# Patient Record
Sex: FEMALE | URBAN - METROPOLITAN AREA
[De-identification: names, ages, dates, MRNs, and addresses within clinical notes are randomized per-mention and may not be internally consistent; named-entity substitution may affect disease eponyms.]

---

## 2022-10-30 ENCOUNTER — INPATIENT (INPATIENT)
Facility: HOSPITAL | Age: 67
LOS: 4 days | Discharge: HOME HEALTH | DRG: 177 | End: 2022-11-04
Attending: INTERNAL MEDICINE | Admitting: STUDENT IN AN ORGANIZED HEALTH CARE EDUCATION/TRAINING PROGRAM
Payer: COMMERCIAL

## 2022-10-30 VITALS
RESPIRATION RATE: 19 BRPM | TEMPERATURE: 100 F | HEART RATE: 100 BPM | SYSTOLIC BLOOD PRESSURE: 202 MMHG | HEIGHT: 60 IN | OXYGEN SATURATION: 96 % | DIASTOLIC BLOOD PRESSURE: 99 MMHG | WEIGHT: 130.07 LBS

## 2022-10-30 LAB
ALBUMIN SERPL ELPH-MCNC: 4.5 G/DL — SIGNIFICANT CHANGE UP (ref 3.3–5)
ALP SERPL-CCNC: 134 U/L — HIGH (ref 40–120)
ALT FLD-CCNC: 15 U/L — SIGNIFICANT CHANGE UP (ref 10–45)
ANION GAP SERPL CALC-SCNC: 12 MMOL/L — SIGNIFICANT CHANGE UP (ref 5–17)
AST SERPL-CCNC: 17 U/L — SIGNIFICANT CHANGE UP (ref 10–40)
BASE EXCESS BLDV CALC-SCNC: 1.1 MMOL/L — SIGNIFICANT CHANGE UP (ref -2–3)
BASOPHILS # BLD AUTO: 0.03 K/UL — SIGNIFICANT CHANGE UP (ref 0–0.2)
BASOPHILS NFR BLD AUTO: 0.3 % — SIGNIFICANT CHANGE UP (ref 0–2)
BILIRUB SERPL-MCNC: 0.7 MG/DL — SIGNIFICANT CHANGE UP (ref 0.2–1.2)
BLOOD GAS VENOUS - CREATININE: SIGNIFICANT CHANGE UP MG/DL (ref 0.5–1.3)
BUN SERPL-MCNC: 11 MG/DL — SIGNIFICANT CHANGE UP (ref 7–23)
CA-I SERPL-SCNC: 1.16 MMOL/L — SIGNIFICANT CHANGE UP (ref 1.15–1.33)
CALCIUM SERPL-MCNC: 9 MG/DL — SIGNIFICANT CHANGE UP (ref 8.4–10.5)
CHLORIDE BLDV-SCNC: 101 MMOL/L — SIGNIFICANT CHANGE UP (ref 96–108)
CHLORIDE SERPL-SCNC: 101 MMOL/L — SIGNIFICANT CHANGE UP (ref 96–108)
CO2 BLDV-SCNC: 28 MMOL/L — HIGH (ref 22–26)
CO2 SERPL-SCNC: 25 MMOL/L — SIGNIFICANT CHANGE UP (ref 22–31)
CREAT SERPL-MCNC: 0.45 MG/DL — LOW (ref 0.5–1.3)
EGFR: 107 ML/MIN/1.73M2 — SIGNIFICANT CHANGE UP
EOSINOPHIL # BLD AUTO: 0.04 K/UL — SIGNIFICANT CHANGE UP (ref 0–0.5)
EOSINOPHIL NFR BLD AUTO: 0.4 % — SIGNIFICANT CHANGE UP (ref 0–6)
GAS PNL BLDV: 133 MMOL/L — LOW (ref 136–145)
GAS PNL BLDV: SIGNIFICANT CHANGE UP
GAS PNL BLDV: SIGNIFICANT CHANGE UP
GLUCOSE BLDV-MCNC: 256 MG/DL — HIGH (ref 70–99)
GLUCOSE SERPL-MCNC: 240 MG/DL — HIGH (ref 70–99)
HCO3 BLDV-SCNC: 27 MMOL/L — SIGNIFICANT CHANGE UP (ref 22–29)
HCT VFR BLD CALC: 43.2 % — SIGNIFICANT CHANGE UP (ref 34.5–45)
HCT VFR BLDA CALC: 53 % — HIGH (ref 34.5–46.5)
HGB BLD CALC-MCNC: 17.5 G/DL — HIGH (ref 11.7–16.1)
HGB BLD-MCNC: 14.5 G/DL — SIGNIFICANT CHANGE UP (ref 11.5–15.5)
IMM GRANULOCYTES NFR BLD AUTO: 0.4 % — SIGNIFICANT CHANGE UP (ref 0–0.9)
LACTATE BLDV-MCNC: 1.7 MMOL/L — SIGNIFICANT CHANGE UP (ref 0.5–2)
LYMPHOCYTES # BLD AUTO: 1.05 K/UL — SIGNIFICANT CHANGE UP (ref 1–3.3)
LYMPHOCYTES # BLD AUTO: 10.2 % — LOW (ref 13–44)
MAGNESIUM SERPL-MCNC: 2.1 MG/DL — SIGNIFICANT CHANGE UP (ref 1.6–2.6)
MCHC RBC-ENTMCNC: 29.1 PG — SIGNIFICANT CHANGE UP (ref 27–34)
MCHC RBC-ENTMCNC: 33.6 GM/DL — SIGNIFICANT CHANGE UP (ref 32–36)
MCV RBC AUTO: 86.6 FL — SIGNIFICANT CHANGE UP (ref 80–100)
MONOCYTES # BLD AUTO: 0.88 K/UL — SIGNIFICANT CHANGE UP (ref 0–0.9)
MONOCYTES NFR BLD AUTO: 8.6 % — SIGNIFICANT CHANGE UP (ref 2–14)
NEUTROPHILS # BLD AUTO: 8.22 K/UL — HIGH (ref 1.8–7.4)
NEUTROPHILS NFR BLD AUTO: 80.1 % — HIGH (ref 43–77)
NRBC # BLD: 0 /100 WBCS — SIGNIFICANT CHANGE UP (ref 0–0)
PCO2 BLDV: 44 MMHG — HIGH (ref 39–42)
PH BLDV: 7.39 — SIGNIFICANT CHANGE UP (ref 7.32–7.43)
PLATELET # BLD AUTO: 203 K/UL — SIGNIFICANT CHANGE UP (ref 150–400)
PO2 BLDV: 55 MMHG — HIGH (ref 25–45)
POTASSIUM BLDV-SCNC: 3.3 MMOL/L — LOW (ref 3.5–5.1)
POTASSIUM SERPL-MCNC: 3.4 MMOL/L — LOW (ref 3.5–5.3)
POTASSIUM SERPL-SCNC: 3.4 MMOL/L — LOW (ref 3.5–5.3)
PROT SERPL-MCNC: 7.8 G/DL — SIGNIFICANT CHANGE UP (ref 6–8.3)
RBC # BLD: 4.99 M/UL — SIGNIFICANT CHANGE UP (ref 3.8–5.2)
RBC # FLD: 12 % — SIGNIFICANT CHANGE UP (ref 10.3–14.5)
SAO2 % BLDV: 89.8 % — HIGH (ref 67–88)
SODIUM SERPL-SCNC: 138 MMOL/L — SIGNIFICANT CHANGE UP (ref 135–145)
TROPONIN T, HIGH SENSITIVITY RESULT: 6 NG/L — SIGNIFICANT CHANGE UP (ref 0–51)
WBC # BLD: 10.26 K/UL — SIGNIFICANT CHANGE UP (ref 3.8–10.5)
WBC # FLD AUTO: 10.26 K/UL — SIGNIFICANT CHANGE UP (ref 3.8–10.5)

## 2022-10-30 PROCEDURE — 93010 ELECTROCARDIOGRAM REPORT: CPT

## 2022-10-30 PROCEDURE — 71045 X-RAY EXAM CHEST 1 VIEW: CPT | Mod: 26

## 2022-10-30 PROCEDURE — 70450 CT HEAD/BRAIN W/O DYE: CPT | Mod: 26,MA

## 2022-10-30 PROCEDURE — 99284 EMERGENCY DEPT VISIT MOD MDM: CPT | Mod: CS

## 2022-10-30 RX ORDER — ACETAMINOPHEN 500 MG
1000 TABLET ORAL ONCE
Refills: 0 | Status: COMPLETED | OUTPATIENT
Start: 2022-10-30 | End: 2022-10-30

## 2022-10-30 RX ORDER — SODIUM CHLORIDE 9 MG/ML
1000 INJECTION, SOLUTION INTRAVENOUS ONCE
Refills: 0 | Status: COMPLETED | OUTPATIENT
Start: 2022-10-30 | End: 2022-10-30

## 2022-10-30 RX ADMIN — Medication 400 MILLIGRAM(S): at 23:55

## 2022-10-30 RX ADMIN — SODIUM CHLORIDE 1000 MILLILITER(S): 9 INJECTION, SOLUTION INTRAVENOUS at 23:55

## 2022-10-30 NOTE — ED PROVIDER NOTE - OBJECTIVE STATEMENT
66 yo f pmhx htn and dm not on any meds presents to ed w +covid w headache, cp, sob, chills and muscle aches, and htn. took her bp at home and it was high so she came to ed. was tested for covid today and was +. has been feeling sick the past few days, and yesterday the cp and sob got worse at night, son says she took tylenol yesterday. on exam endorsed headache with mild blurry vision b/l. no fnd on exam. son says she has baseline hpertension and high sugar and her PMD told her to take meds but she did not want to. pt does not known baseline blood pressure. on exam pt hypoxic 92% on RA, lungs clear to auscultation, tachy to 105, and bp 192 /110.

## 2022-10-30 NOTE — ED PROVIDER NOTE - ATTENDING CONTRIBUTION TO CARE
Noe Devi MD:   I personally saw the patient and performed a substantive portion of the visit including all aspects of the medical decision making.    MDM: Noe Devi MD:   I personally saw the patient and performed a substantive portion of the visit including all aspects of the medical decision making.    MDM: 67-year-old female with history of hypertension and diabetes but not on medications, who presents with elevated blood pressure.  Patient was feeling headache, chest discomfort, shortness of breath, fevers, chills, muscle aches, and wanted to take her blood pressure, found to be 190s at home, but unknown normal BPs.  Also reports mild headache on examination and blurry vision.  On examination patient was found to be hypoxic to 92%, tachycardic to 105 and elevated BP 1 92-1 10, lungs CTA B, neuro exam nonfocal.  Will obtain labs to evaluate for hematologic disorder, metabolic derangements, hepatic and renal function, and screen for infectious pathology.  Will assess with EKG, cardiac monitor and troponin to evaluate for ACS.  D-dimer to rule out PE.  Patient placed on 4 L nasal cannula for hypoxia, found to be COVID-positive, was given dexamethasone.  CT head given patient's headache and vision changes with the setting of elevated BP, no acute ICH or acute pathology, but there is chronic microvascular ischemic changes and indeterminate age infarct.  Patient also found to be hyperglycemic, but normal anion gap, not acidotic.  Due to persistent elevation in BP, was given oral antihypertensive, based on patient's prior medication/dose.  The patient will need to be admitted to the hospital for continued evaluation and management, as well as to optimize medical management and to provide outpatient needs assessment.  Discussed with the accepting physician regarding the initial presentation, diagnostic studies, treatments given in the ED, and current plan of care.   The patient was accepted by and endorsed to the medicine team.

## 2022-10-30 NOTE — ED PROVIDER NOTE - WR ORDER NAME 1
What Is The Reason For Today's Visit?: Surveillance against skin cancer recurrences
Year Excised?: 2005
Xray Chest 1 View- PORTABLE-Urgent

## 2022-10-30 NOTE — ED PROVIDER NOTE - NS ED ROS FT
General: +chills  HENT: + nasal congestion, rhinorrhea  Eyes: + blurred vision  CV: +chest pain  Resp: +difficulty breathing, cough  Abdominal: denies nausea, vomiting, diarrhea, abdominal pain  MSK: +muscle aches  Neuro: +headaches,

## 2022-10-30 NOTE — ED PROVIDER NOTE - CARE PLAN
Principal Discharge DX:	Hypertension   1 Principal Discharge DX:	Acute hypoxemic respiratory failure due to COVID-19  Secondary Diagnosis:	Hypertensive emergency

## 2022-10-30 NOTE — ED PROVIDER NOTE - PHYSICAL EXAMINATION
CARDIAC: sinus tach no appreciable murmurs  PULM: diminished breath sounds l base, no rales, rhonchi, wheezing  GI: abdomen nondistended, soft, nontender, no guarding, rebound tenderness  NEURO: no focal motor or sensory deficits, CN2-12 intact, normal speech,   MSK: no peripheral edema, no calf tenderness b/l CARDIAC: sinus tach no appreciable murmurs  PULM: diminished breath sounds l base, no rales, rhonchi, wheezing  GI: abdomen nondistended, soft, nontender, no guarding, rebound tenderness  NEURO: no focal motor or sensory deficits, CN2-12 intact, normal speech,   MSK: no peripheral edema, no calf tenderness b/l  SKIN: pulsatile fistula right upper arm

## 2022-10-30 NOTE — ED PROVIDER NOTE - CLINICAL SUMMARY MEDICAL DECISION MAKING FREE TEXT BOX
68 yo f w covid tested + today presents to ed bc of high blood pressure. son took it w her at home and it was 190s systolic. patient has been told she has HTN and DM but does not want to take any meds. endorses headache, mild change in vision, chest pain and sob, cough, sore throat, muscle aches. hypoxic 92% on RA 97% on 4L NC, tachy 110, bp on arrival 201 systolic, in room 192/110. concern for covid pneumonia, vs PE (covid hypercoag, hypoxic, tachycardic), and also htn emergency-headache and blurred vision no other fnd on exam. will give tylenol fluids basic blood work, cxray to r/o pneumonia, dimer, ekg, and ct head.

## 2022-10-31 DIAGNOSIS — I10 ESSENTIAL (PRIMARY) HYPERTENSION: ICD-10-CM

## 2022-10-31 DIAGNOSIS — E78.5 HYPERLIPIDEMIA, UNSPECIFIED: ICD-10-CM

## 2022-10-31 DIAGNOSIS — U07.1 COVID-19: ICD-10-CM

## 2022-10-31 DIAGNOSIS — E11.9 TYPE 2 DIABETES MELLITUS WITHOUT COMPLICATIONS: ICD-10-CM

## 2022-10-31 DIAGNOSIS — Z29.9 ENCOUNTER FOR PROPHYLACTIC MEASURES, UNSPECIFIED: ICD-10-CM

## 2022-10-31 LAB
A1C WITH ESTIMATED AVERAGE GLUCOSE RESULT: 11 % — HIGH (ref 4–5.6)
ALBUMIN SERPL ELPH-MCNC: 4.2 G/DL — SIGNIFICANT CHANGE UP (ref 3.3–5)
ALP SERPL-CCNC: 114 U/L — SIGNIFICANT CHANGE UP (ref 40–120)
ALT FLD-CCNC: 15 U/L — SIGNIFICANT CHANGE UP (ref 10–45)
ANION GAP SERPL CALC-SCNC: 13 MMOL/L — SIGNIFICANT CHANGE UP (ref 5–17)
AST SERPL-CCNC: 15 U/L — SIGNIFICANT CHANGE UP (ref 10–40)
BILIRUB SERPL-MCNC: 0.7 MG/DL — SIGNIFICANT CHANGE UP (ref 0.2–1.2)
BUN SERPL-MCNC: 11 MG/DL — SIGNIFICANT CHANGE UP (ref 7–23)
CALCIUM SERPL-MCNC: 9.3 MG/DL — SIGNIFICANT CHANGE UP (ref 8.4–10.5)
CHLORIDE SERPL-SCNC: 102 MMOL/L — SIGNIFICANT CHANGE UP (ref 96–108)
CO2 SERPL-SCNC: 23 MMOL/L — SIGNIFICANT CHANGE UP (ref 22–31)
CREAT SERPL-MCNC: 0.46 MG/DL — LOW (ref 0.5–1.3)
D DIMER BLD IA.RAPID-MCNC: <150 NG/ML DDU — SIGNIFICANT CHANGE UP
EGFR: 106 ML/MIN/1.73M2 — SIGNIFICANT CHANGE UP
ESTIMATED AVERAGE GLUCOSE: 269 MG/DL — HIGH (ref 68–114)
FLUAV AG NPH QL: SIGNIFICANT CHANGE UP
FLUBV AG NPH QL: SIGNIFICANT CHANGE UP
GLUCOSE BLDC GLUCOMTR-MCNC: 241 MG/DL — HIGH (ref 70–99)
GLUCOSE BLDC GLUCOMTR-MCNC: 245 MG/DL — HIGH (ref 70–99)
GLUCOSE BLDC GLUCOMTR-MCNC: 267 MG/DL — HIGH (ref 70–99)
GLUCOSE BLDC GLUCOMTR-MCNC: 413 MG/DL — HIGH (ref 70–99)
GLUCOSE SERPL-MCNC: 288 MG/DL — HIGH (ref 70–99)
HCT VFR BLD CALC: 42.2 % — SIGNIFICANT CHANGE UP (ref 34.5–45)
HGB BLD-MCNC: 14.5 G/DL — SIGNIFICANT CHANGE UP (ref 11.5–15.5)
MCHC RBC-ENTMCNC: 29.7 PG — SIGNIFICANT CHANGE UP (ref 27–34)
MCHC RBC-ENTMCNC: 34.4 GM/DL — SIGNIFICANT CHANGE UP (ref 32–36)
MCV RBC AUTO: 86.3 FL — SIGNIFICANT CHANGE UP (ref 80–100)
NRBC # BLD: 0 /100 WBCS — SIGNIFICANT CHANGE UP (ref 0–0)
PLATELET # BLD AUTO: 204 K/UL — SIGNIFICANT CHANGE UP (ref 150–400)
POTASSIUM SERPL-MCNC: 4 MMOL/L — SIGNIFICANT CHANGE UP (ref 3.5–5.3)
POTASSIUM SERPL-SCNC: 4 MMOL/L — SIGNIFICANT CHANGE UP (ref 3.5–5.3)
PROT SERPL-MCNC: 7.6 G/DL — SIGNIFICANT CHANGE UP (ref 6–8.3)
RBC # BLD: 4.89 M/UL — SIGNIFICANT CHANGE UP (ref 3.8–5.2)
RBC # FLD: 12.1 % — SIGNIFICANT CHANGE UP (ref 10.3–14.5)
RSV RNA NPH QL NAA+NON-PROBE: SIGNIFICANT CHANGE UP
SARS-COV-2 RNA SPEC QL NAA+PROBE: DETECTED
SODIUM SERPL-SCNC: 138 MMOL/L — SIGNIFICANT CHANGE UP (ref 135–145)
WBC # BLD: 8.34 K/UL — SIGNIFICANT CHANGE UP (ref 3.8–10.5)
WBC # FLD AUTO: 8.34 K/UL — SIGNIFICANT CHANGE UP (ref 3.8–10.5)

## 2022-10-31 PROCEDURE — 12345: CPT | Mod: NC

## 2022-10-31 PROCEDURE — 99223 1ST HOSP IP/OBS HIGH 75: CPT | Mod: GC

## 2022-10-31 RX ORDER — INSULIN LISPRO 100/ML
VIAL (ML) SUBCUTANEOUS AT BEDTIME
Refills: 0 | Status: DISCONTINUED | OUTPATIENT
Start: 2022-10-31 | End: 2022-11-01

## 2022-10-31 RX ORDER — POTASSIUM CHLORIDE 20 MEQ
40 PACKET (EA) ORAL ONCE
Refills: 0 | Status: DISCONTINUED | OUTPATIENT
Start: 2022-10-31 | End: 2022-10-31

## 2022-10-31 RX ORDER — DEXTROSE 50 % IN WATER 50 %
25 SYRINGE (ML) INTRAVENOUS ONCE
Refills: 0 | Status: DISCONTINUED | OUTPATIENT
Start: 2022-10-31 | End: 2022-11-04

## 2022-10-31 RX ORDER — AMLODIPINE BESYLATE 2.5 MG/1
2.5 TABLET ORAL ONCE
Refills: 0 | Status: COMPLETED | OUTPATIENT
Start: 2022-10-31 | End: 2022-10-31

## 2022-10-31 RX ORDER — AMLODIPINE BESYLATE 2.5 MG/1
5 TABLET ORAL DAILY
Refills: 0 | Status: DISCONTINUED | OUTPATIENT
Start: 2022-10-31 | End: 2022-11-01

## 2022-10-31 RX ORDER — BNT162B2 ORIGINAL AND OMICRON BA.4/BA.5 .1125; .1125 MG/2.25ML; MG/2.25ML
0.3 INJECTION, SUSPENSION INTRAMUSCULAR ONCE
Refills: 0 | Status: DISCONTINUED | OUTPATIENT
Start: 2022-10-31 | End: 2022-10-31

## 2022-10-31 RX ORDER — POTASSIUM CHLORIDE 20 MEQ
20 PACKET (EA) ORAL ONCE
Refills: 0 | Status: COMPLETED | OUTPATIENT
Start: 2022-10-31 | End: 2022-10-31

## 2022-10-31 RX ORDER — DEXAMETHASONE 0.5 MG/5ML
6 ELIXIR ORAL DAILY
Refills: 0 | Status: DISCONTINUED | OUTPATIENT
Start: 2022-10-31 | End: 2022-11-04

## 2022-10-31 RX ORDER — INSULIN LISPRO 100/ML
VIAL (ML) SUBCUTANEOUS
Refills: 0 | Status: DISCONTINUED | OUTPATIENT
Start: 2022-10-31 | End: 2022-11-01

## 2022-10-31 RX ORDER — GLUCAGON INJECTION, SOLUTION 0.5 MG/.1ML
1 INJECTION, SOLUTION SUBCUTANEOUS ONCE
Refills: 0 | Status: DISCONTINUED | OUTPATIENT
Start: 2022-10-31 | End: 2022-11-04

## 2022-10-31 RX ORDER — HYDRALAZINE HCL 50 MG
10 TABLET ORAL DAILY
Refills: 0 | Status: DISCONTINUED | OUTPATIENT
Start: 2022-10-31 | End: 2022-11-04

## 2022-10-31 RX ORDER — DEXTROSE 50 % IN WATER 50 %
12.5 SYRINGE (ML) INTRAVENOUS ONCE
Refills: 0 | Status: DISCONTINUED | OUTPATIENT
Start: 2022-10-31 | End: 2022-11-04

## 2022-10-31 RX ORDER — INSULIN GLARGINE 100 [IU]/ML
5 INJECTION, SOLUTION SUBCUTANEOUS ONCE
Refills: 0 | Status: DISCONTINUED | OUTPATIENT
Start: 2022-10-31 | End: 2022-10-31

## 2022-10-31 RX ORDER — DEXTROSE 50 % IN WATER 50 %
15 SYRINGE (ML) INTRAVENOUS ONCE
Refills: 0 | Status: DISCONTINUED | OUTPATIENT
Start: 2022-10-31 | End: 2022-10-31

## 2022-10-31 RX ORDER — DEXAMETHASONE 0.5 MG/5ML
6 ELIXIR ORAL ONCE
Refills: 0 | Status: COMPLETED | OUTPATIENT
Start: 2022-10-31 | End: 2022-10-31

## 2022-10-31 RX ORDER — ENOXAPARIN SODIUM 100 MG/ML
40 INJECTION SUBCUTANEOUS EVERY 24 HOURS
Refills: 0 | Status: DISCONTINUED | OUTPATIENT
Start: 2022-10-31 | End: 2022-11-04

## 2022-10-31 RX ORDER — SODIUM CHLORIDE 9 MG/ML
1000 INJECTION, SOLUTION INTRAVENOUS
Refills: 0 | Status: DISCONTINUED | OUTPATIENT
Start: 2022-10-31 | End: 2022-11-04

## 2022-10-31 RX ORDER — INFLUENZA VIRUS VACCINE 15; 15; 15; 15 UG/.5ML; UG/.5ML; UG/.5ML; UG/.5ML
0.7 SUSPENSION INTRAMUSCULAR ONCE
Refills: 0 | Status: DISCONTINUED | OUTPATIENT
Start: 2022-10-31 | End: 2022-10-31

## 2022-10-31 RX ORDER — REMDESIVIR 5 MG/ML
100 INJECTION INTRAVENOUS EVERY 24 HOURS
Refills: 0 | Status: COMPLETED | OUTPATIENT
Start: 2022-11-01 | End: 2022-11-04

## 2022-10-31 RX ORDER — INSULIN GLARGINE 100 [IU]/ML
5 INJECTION, SOLUTION SUBCUTANEOUS EVERY MORNING
Refills: 0 | Status: DISCONTINUED | OUTPATIENT
Start: 2022-10-31 | End: 2022-10-31

## 2022-10-31 RX ORDER — REMDESIVIR 5 MG/ML
200 INJECTION INTRAVENOUS EVERY 24 HOURS
Refills: 0 | Status: COMPLETED | OUTPATIENT
Start: 2022-10-31 | End: 2022-10-31

## 2022-10-31 RX ORDER — DEXAMETHASONE 0.5 MG/5ML
5 ELIXIR ORAL ONCE
Refills: 0 | Status: DISCONTINUED | OUTPATIENT
Start: 2022-10-31 | End: 2022-10-31

## 2022-10-31 RX ORDER — INSULIN GLARGINE 100 [IU]/ML
5 INJECTION, SOLUTION SUBCUTANEOUS AT BEDTIME
Refills: 0 | Status: DISCONTINUED | OUTPATIENT
Start: 2022-10-31 | End: 2022-11-01

## 2022-10-31 RX ORDER — HYDRALAZINE HCL 50 MG
10 TABLET ORAL ONCE
Refills: 0 | Status: DISCONTINUED | OUTPATIENT
Start: 2022-10-31 | End: 2022-11-04

## 2022-10-31 RX ORDER — REMDESIVIR 5 MG/ML
INJECTION INTRAVENOUS
Refills: 0 | Status: COMPLETED | OUTPATIENT
Start: 2022-10-31 | End: 2022-11-04

## 2022-10-31 RX ADMIN — Medication 6 MILLIGRAM(S): at 01:35

## 2022-10-31 RX ADMIN — Medication 4: at 21:40

## 2022-10-31 RX ADMIN — AMLODIPINE BESYLATE 5 MILLIGRAM(S): 2.5 TABLET ORAL at 12:53

## 2022-10-31 RX ADMIN — Medication 2: at 18:41

## 2022-10-31 RX ADMIN — INSULIN GLARGINE 5 UNIT(S): 100 INJECTION, SOLUTION SUBCUTANEOUS at 21:40

## 2022-10-31 RX ADMIN — REMDESIVIR 500 MILLIGRAM(S): 5 INJECTION INTRAVENOUS at 17:51

## 2022-10-31 RX ADMIN — AMLODIPINE BESYLATE 2.5 MILLIGRAM(S): 2.5 TABLET ORAL at 01:31

## 2022-10-31 RX ADMIN — Medication 20 MILLIEQUIVALENT(S): at 06:58

## 2022-10-31 RX ADMIN — Medication 2: at 13:26

## 2022-10-31 NOTE — H&P ADULT - PROBLEM SELECTOR PLAN 2
on 4 L nasal cannula for hypoxia  cxr neg  -s/p dexamethasone  - continue to monitor sats  - isolation on 4 L nasal cannula for hypoxia  cxr neg  neg DD  -s/p dexamethasone  - continue to monitor sats  - isolation  - lovenox ppx

## 2022-10-31 NOTE — H&P ADULT - ATTENDING COMMENTS
Pt was seen and examined during key portion of E/M service. Case discussed with resident. H&P reviewed and edited where appropriate. Other than the following, I agree with the above history, exam, assessment, and plan.  67F with PMH DM2, HTN and HLD presents with hypertension and COVID concerning for pneumonia.  start norvasc. lovenox, decadron. monitor for hypoxia. hold off abx. start lantus + ISS

## 2022-10-31 NOTE — ED ADULT NURSE NOTE - CARDIO ASSESSMENT
--- Debridement Text: The wound edges were debrided prior to proceeding with the closure to facilitate wound healing.

## 2022-10-31 NOTE — H&P ADULT - NSHPPHYSICALEXAM_GEN_ALL_CORE
CONSTITUTIONAL: NAD  HEAD: normocephalic, atraumatic  EYES: PERRLA, conjunctiva and sclera clear  ENMT: Moist oral mucosa  RESPIRATORY: Normal respiratory effort, + decreased breath sounds b/l  CARDIOVASCULAR: Regular rate and rhythm, normal S1 and S2, no lower extremity edema  ABDOMEN: Nontender to palpation, positive bowel sounds, no rebound/guarding  MUSCULOSKELETAL: No joint swelling or tenderness to palpation, no cyanosis or edema  NEUROLOGY: Alert, oriented, CN 2-12 intact and symmetric  SKIN: No rashes, warm, dry CONSTITUTIONAL: NAD  HEAD: normocephalic, atraumatic  EYES: PERRLA, conjunctiva and sclera clear  ENMT: Moist oral mucosa  RESPIRATORY: Normal respiratory effort, + decreased breath sounds b/l  CARDIOVASCULAR: Regular rate and rhythm, normal S1 and S2, no lower extremity edema  ABDOMEN: Nontender to palpation, positive bowel sounds, no rebound/guarding  MUSCULOSKELETAL: + right thigh tenderness to palpation, no cyanosis or edema  NEUROLOGY: Alert, oriented, CN 2-12 intact and symmetric  SKIN: No rashes, warm, dry

## 2022-10-31 NOTE — H&P ADULT - NSHPREVIEWOFSYSTEMS_GEN_ALL_CORE
General: +chills, no fever  HENT: + nasal congestion, rhinorrhea  Eyes: + blurred vision  CV: + chest pain  Resp: + difficulty breathing, cough  Abdominal: denies nausea, vomiting, diarrhea, abdominal pain  MSK: +muscle aches  Neuro: + headaches, + blurry vision  Skin: no rashes General: +chills, no fever  HENT: + nasal congestion, rhinorrhea, no neck pain  Eyes: + blurred vision, no eye pain  CV: + chest pain, no palp  Resp: + difficulty breathing, cough, no wheeze  Abdominal: denies nausea, vomiting, diarrhea, abdominal pain  MSK: +muscle aches, no back pain  Neuro: + headaches, + blurry vision  Skin: no rashes, no lesions

## 2022-10-31 NOTE — ED ADULT NURSE NOTE - OBJECTIVE STATEMENT
Pt is a 67y female that came into [facility] ED with son due to elevated BP and tested positive for covid with symptoms of body aches, HA, sore throat, cough, stuffy nose and dizziness. Pt son translates at bedside, endorses an elevated home BP of 185/105 at 1100 which dropped at 1300 to170/99 and cindy again at 1600 which is when they came to the ED. PMH of unmanaged HTN, high cholesterol, and increased blood glucose taking no home medications and no significant PSH. Pt is A&Ox3 with generalized body pain of 7/10. Pt currently denies any SOB, dizziness, CP, palpitations. Placed on continuous cardiac monitor NSR and pulse oximetry, placed on 4L nasal cannula with DO Gagnon at bedside due to decreased SpO2. Denies any fever/chills, n/v/d/c, abd pain, and abd is soft and tender to touch, endorses decreased appetite. Skin is warm to the touch with strong peripheral pulses. Bed rails raised, bed in lowest position, and call bell within reach.

## 2022-10-31 NOTE — PROGRESS NOTE ADULT - PROBLEM SELECTOR PLAN 1
CT head no acute ICH or acute pathology, but chronic microvascular ischemic changes and indeterminate age infarct.   c/o HA and mild blurry vision with no findings on exam  - S/P oral amlodipine 2.5mg  - amlodipine 5mg qd--> monitor BP   - hydralazine 10mg q8 prn SBP>160 CT head no acute ICH or acute pathology, but chronic microvascular ischemic changes and indeterminate age infarct.   c/o HA and mild blurry vision with no findings on exam  - S/P oral amlodipine 2.5mg  - amlodipine 5mg qd--> monitor BP   - hydralazine 10mg q8 prn SBP>170

## 2022-10-31 NOTE — PROGRESS NOTE ADULT - PROBLEM SELECTOR PLAN 2
on 4 L nasal cannula for hypoxia  cxr neg  neg DD  -CW  dexamethasone  - continue to monitor sats  - isolation  - monitor inflammatrory markers   - Start Dexamethasone   - lovenox ppx on 4 L nasal cannula for hypoxia titrate off O2 as tolerated   cxr neg  neg DD  -CW  dexamethasone  - continue to monitor sats  - isolation  - monitor inflammatrory markers   - Start Dexamethasone   - lovenox ppx

## 2022-10-31 NOTE — H&P ADULT - NSHPLABSRESULTS_GEN_ALL_CORE
14.5   10.26 )-----------( 203      ( 30 Oct 2022 23:18 )             43.2       10-30    138  |  101  |  11  ----------------------------<  240<H>  3.4<L>   |  25  |  0.45<L>    Ca    9.0      30 Oct 2022 23:18  Mg     2.1     10-30    TPro  7.8  /  Alb  4.5  /  TBili  0.7  /  DBili  x   /  AST  17  /  ALT  15  /  AlkPhos  134<H>  10-30                      Lactate Trend            CAPILLARY BLOOD GLUCOSE 14.5   10.26 )-----------( 203      ( 30 Oct 2022 23:18 )             43.2       10-30    138  |  101  |  11  ----------------------------<  240<H>  3.4<L>   |  25  |  0.45<L>    Ca    9.0      30 Oct 2022 23:18  Mg     2.1     10-30    TPro  7.8  /  Alb  4.5  /  TBili  0.7  /  DBili  x   /  AST  17  /  ALT  15  /  AlkPhos  134<H>  10-30              Lactate Trend        CAPILLARY BLOOD GLUCOSE

## 2022-10-31 NOTE — H&P ADULT - HISTORY OF PRESENT ILLNESS
68 yo f pmhx htn and dm not on any meds presents to ed w +covid w headache, cp, sob, chills and muscle aches, and htn. took her bp at home and it was high so she came to ed. was tested for covid today and was +. has been feeling sick the past few days, and yesterday the cp and sob got worse at night, son says she took tylenol yesterday. on exam endorsed headache with mild blurry vision b/l. no fnd on exam. son says she has baseline hpertension and high sugar and her PMD told her to take meds but she did not want to. pt does not known baseline blood pressure. on exam pt hypoxic 92% on RA, lungs clear to auscultation, tachy to 105, and bp 192 /110.  '  In the ED, VS   Labs remarkable for   Imaging included   Patient given   67F with pmhx DM, HTN and HLD presents with hypertension and tested positive for COVID w headache, cp, sob, chills and muscle aches. Pt states she took her bp at home several times and it was high (up to 185/105 at home). She reports that she also tested herself at home for covid today and was has been feeling sick for the past few days. Ot note, patient adds that yesterday the cp and sob got worse at night. Per son pt took tylenol yesterday and has been c/o for the past few days of mild blurry vision b/l. Per son, patient has not seen a PMD in over three years, but was told in the past that she has HTN and DM and was told her to take meds but she did not want to.  Patient denies fevers, numbness, tingling, N/V/D. Patient states that she was vaccinated and boosted against COVID, and does not have a cardiologist. Patient lives at home with  and son. Son adds that patient is normally independent but is not very active at home.    In the ED, /99, otherwise unremarkable. Labs remarkable for K 3.4, glucose 240, alk phos 134, COVID +. Imaging included CT head non con revealed indeterminate age lacunar infarct involving left ganglio-capsular region. Chest xray neg. Patient given 1 LR, amlodipine 2.5mg, Tylenol and decadron. Patient later hypoxic to 92% on RA and placed on 4LNC.    PCP: Marin Ting

## 2022-10-31 NOTE — H&P ADULT - PROBLEM SELECTOR PLAN 1
persistent elevation in BP  CT head no acute ICH or acute pathology, but chronic microvascular ischemic changes and indeterminate age infarct.   c/o HA and mild blurry vision with no findings on exam  - S/P oral amlodipine 2.5mg persistent elevation in BP  CT head no acute ICH or acute pathology, but chronic microvascular ischemic changes and indeterminate age infarct.   c/o HA and mild blurry vision with no findings on exam  - S/P oral amlodipine 2.5mg  - amlodipine 5mg qd  - hydralazine 10mg q8 prn SBP>160

## 2022-10-31 NOTE — PATIENT PROFILE ADULT - HOME ACCESSIBILITY CONCERNS
Detail Level: Simple Detail Level: Zone Quality 137: Melanoma: Continuity Of Care - Recall System: Patient information entered into a recall system that includes: target date for the next exam specified AND a process to follow up with patients regarding missed or unscheduled appointments Quality 224: Stage 0-Iic Melanoma: Overutilization Of Imaging Studies For Only Stage 0-Iic Melanoma: None of the following diagnostic imaging studies ordered: chest X-ray, CT, Ultrasound, MRI, PET, or nuclear medicine scans (ML) none

## 2022-10-31 NOTE — PROGRESS NOTE ADULT - NSPROGADDITIONALINFOA_GEN_ALL_CORE
Mary Sharma   Hospitalist    /TEAMS Patient WANTS to go home because she is worried about her finances as her deductible is so high as per son.  Will speak with  in [place] Sharma   Hospitalist   [phone] /TEAMS

## 2022-10-31 NOTE — H&P ADULT - ASSESSMENT
67 f w covid tested + today presents to ed bc of high blood pressure. son took it w her at home and it was 190s systolic. patient has been told she has HTN and DM but does not want to take any meds. endorses headache, mild change in vision, chest pain and sob, cough, sore throat, muscle aches. hypoxic 92% on RA 97% on 4L NC, tachy 110, bp on arrival 201 systolic, in room 192/110. concern for covid pneumonia, vs PE (covid hypercoag, hypoxic, tachycardic), and also htn emergency-headache and blurred vision no other fnd on exam. will give tylenol fluids basic blood work, cxray to r/o pneumonia, dimer, ekg, and ct head. 67F with PMH DM, HTN and HLD presents with hypertension and COVID concerning for pneumonia vs PE (covid hypercoag, hypoxic, tachycardic), and also htn emergency-headache and blurred vision. 67F with PMH DM2, HTN and HLD presents with hypertension and COVID concerning for pneumonia.

## 2022-10-31 NOTE — ED ADULT NURSE REASSESSMENT NOTE - NS ED NURSE REASSESS COMMENT FT1
Pt was removed off of nasal cannula, tolerating well on room air with SpO2 of 97% and no complaints of SOB, dyspnea, or difficulty breathing. Pt endorses pain and HA relief post pain medication. Updated pt and son on plan of care and verbalized understanding. Vitals retaken and medication administered for elevated BP as per MD order. Bed at lowest position, side rails up, and call bell within reach.

## 2022-10-31 NOTE — PROGRESS NOTE ADULT - SUBJECTIVE AND OBJECTIVE BOX
Patient is a 67y old  Female who presents with a chief complaint of hypertension (31 Oct 2022 05:41)      SUBJECTIVE / OVERNIGHT EVENTS:    Tele reviewed:       ADDITIONAL REVIEW OF SYSTEMS: Negative except for above    MEDICATIONS  (STANDING):  amLODIPine   Tablet 5 milliGRAM(s) Oral daily  dexAMETHasone     Tablet 6 milliGRAM(s) Oral daily  dextrose 5%. 1000 milliLiter(s) (50 mL/Hr) IV Continuous <Continuous>  dextrose 5%. 1000 milliLiter(s) (100 mL/Hr) IV Continuous <Continuous>  dextrose 50% Injectable 25 Gram(s) IV Push once  dextrose 50% Injectable 12.5 Gram(s) IV Push once  dextrose 50% Injectable 25 Gram(s) IV Push once  enoxaparin Injectable 40 milliGRAM(s) SubCutaneous every 24 hours  glucagon  Injectable 1 milliGRAM(s) IntraMuscular once  insulin glargine Injectable (LANTUS) 5 Unit(s) SubCutaneous at bedtime  insulin lispro (ADMELOG) corrective regimen sliding scale   SubCutaneous three times a day before meals  insulin lispro (ADMELOG) corrective regimen sliding scale   SubCutaneous at bedtime    MEDICATIONS  (PRN):  hydrALAZINE 10 milliGRAM(s) Oral once PRN Systolic blood pressure >160      CAPILLARY BLOOD GLUCOSE      POCT Blood Glucose.: 267 mg/dL (31 Oct 2022 09:51)    I&O's Summary    30 Oct 2022 07:01  -  31 Oct 2022 07:00  --------------------------------------------------------  IN: 200 mL / OUT: 0 mL / NET: 200 mL        PHYSICAL EXAM:  Vital Signs Last 24 Hrs  T(C): 36.8 (31 Oct 2022 08:40), Max: 37.7 (30 Oct 2022 18:43)  T(F): 98.2 (31 Oct 2022 08:40), Max: 99.8 (30 Oct 2022 18:43)  HR: 86 (31 Oct 2022 08:40) (82 - 100)  BP: 152/81 (31 Oct 2022 08:40) (152/81 - 202/99)  BP(mean): 112 (31 Oct 2022 01:45) (112 - 112)  RR: 16 (31 Oct 2022 08:40) (16 - 19)  SpO2: 93% (31 Oct 2022 08:40) (92% - 97%)    Parameters below as of 31 Oct 2022 08:40  Patient On (Oxygen Delivery Method): room air        PHYSICAL EXAM:  GENERAL: NAD, well-developed  HEAD:  Atraumatic, Normocephalic  EYES:  conjunctiva and sclera clear  NECK: Supple, No JVD  CHEST/LUNG: Clear to auscultation bilaterally; No wheeze  HEART: Regular rate and rhythm; No murmurs, rubs, or gallops  ABDOMEN: Soft, Nontender, Nondistended; Bowel sounds present  EXTREMITIES:  2+ Peripheral Pulses, No clubbing, cyanosis, or edema  PSYCH: AAOx3  NEUROLOGY: non-focal  SKIN: No rashes or lesions      LABS:                        14.5   10.26 )-----------( 203      ( 30 Oct 2022 23:18 )             43.2     10-30    138  |  101  |  11  ----------------------------<  240<H>  3.4<L>   |  25  |  0.45<L>    Ca    9.0      30 Oct 2022 23:18  Mg     2.1     10-30    TPro  7.8  /  Alb  4.5  /  TBili  0.7  /  DBili  x   /  AST  17  /  ALT  15  /  AlkPhos  134<H>  10-30                RADIOLOGY & ADDITIONAL TESTS:    Imaging Personally Reviewed:    Electrocardiogram Personally Reviewed:    COORDINATION OF CARE:  Care Discussed with Consultants/Other Providers [Y/N]:  Prior or Outpatient Records Reviewed [Y/N]:     Patient is a 67y old  Female who presents with a chief complaint of hypertension (31 Oct 2022 05:41)      SUBJECTIVE / OVERNIGHT EVENTS:    67F with pmhx DM, HTN and HLD presents with hypertension and tested positive for COVID w headache, cp, sob, chills and muscle aches. Pt states she took her bp at home several times and it was high (up to 185/105 at home). She reports that she also tested herself at home for covid today and was has been feeling sick for the past few days. Ot note, patient adds that yesterday the cp and sob got worse at night. Per son pt took tylenol yesterday and has been c/o for the past few days of mild blurry vision b/l. Per son, patient has not seen a PMD in over three years, but was told in the past that she has HTN and DM and was told her to take meds but she did not want to.  Patient denies fevers, numbness, tingling, N/V/D. Patient states that she was vaccinated and boosted against COVID, and does not have a cardiologist. Patient lives at home with  and son. Son adds that patient is normally independent but is not very active at home.  In the ED, /99, otherwise unremarkable. Labs remarkable for K 3.4, glucose 240, alk phos 134, COVID +. Imaging included CT head non con revealed indeterminate age lacunar infarct involving left ganglio-capsular region. Chest xray neg. Patient given 1 LR, amlodipine 2.5mg, Tylenol and decadron. Patient later hypoxic to 92% on RA and placed on 4LNC.      ADDITIONAL REVIEW OF SYSTEMS: Negative except for above    MEDICATIONS  (STANDING):  amLODIPine   Tablet 5 milliGRAM(s) Oral daily  dexAMETHasone     Tablet 6 milliGRAM(s) Oral daily  dextrose 5%. 1000 milliLiter(s) (50 mL/Hr) IV Continuous <Continuous>  dextrose 5%. 1000 milliLiter(s) (100 mL/Hr) IV Continuous <Continuous>  dextrose 50% Injectable 25 Gram(s) IV Push once  dextrose 50% Injectable 12.5 Gram(s) IV Push once  dextrose 50% Injectable 25 Gram(s) IV Push once  enoxaparin Injectable 40 milliGRAM(s) SubCutaneous every 24 hours  glucagon  Injectable 1 milliGRAM(s) IntraMuscular once  insulin glargine Injectable (LANTUS) 5 Unit(s) SubCutaneous at bedtime  insulin lispro (ADMELOG) corrective regimen sliding scale   SubCutaneous three times a day before meals  insulin lispro (ADMELOG) corrective regimen sliding scale   SubCutaneous at bedtime    MEDICATIONS  (PRN):  hydrALAZINE 10 milliGRAM(s) Oral once PRN Systolic blood pressure >160      CAPILLARY BLOOD GLUCOSE      POCT Blood Glucose.: 267 mg/dL (31 Oct 2022 09:51)    I&O's Summary    30 Oct 2022 07:01  -  31 Oct 2022 07:00  --------------------------------------------------------  IN: 200 mL / OUT: 0 mL / NET: 200 mL        PHYSICAL EXAM:  Vital Signs Last 24 Hrs  T(C): 36.8 (31 Oct 2022 08:40), Max: 37.7 (30 Oct 2022 18:43)  T(F): 98.2 (31 Oct 2022 08:40), Max: 99.8 (30 Oct 2022 18:43)  HR: 86 (31 Oct 2022 08:40) (82 - 100)  BP: 152/81 (31 Oct 2022 08:40) (152/81 - 202/99)  BP(mean): 112 (31 Oct 2022 01:45) (112 - 112)  RR: 16 (31 Oct 2022 08:40) (16 - 19)  SpO2: 93% (31 Oct 2022 08:40) (92% - 97%)    Parameters below as of 31 Oct 2022 08:40  Patient On (Oxygen Delivery Method): room air        PHYSICAL EXAM:  GENERAL: NAD, well-developed  HEAD:  Atraumatic, Normocephalic  EYES:  conjunctiva and sclera clear  NECK: Supple, No JVD  CHEST/LUNG: Clear to auscultation bilaterally; No wheeze  HEART: Regular rate and rhythm; No murmurs, rubs, or gallops  ABDOMEN: Soft, Nontender, Nondistended; Bowel sounds present  EXTREMITIES:  2+ Peripheral Pulses, No clubbing, cyanosis, or edema  PSYCH: AAOx3  NEUROLOGY: non-focal  SKIN: No rashes or lesions      LABS:                        14.5   10.26 )-----------( 203      ( 30 Oct 2022 23:18 )             43.2     10-30    138  |  101  |  11  ----------------------------<  240<H>  3.4<L>   |  25  |  0.45<L>    Ca    9.0      30 Oct 2022 23:18  Mg     2.1     10-30    TPro  7.8  /  Alb  4.5  /  TBili  0.7  /  DBili  x   /  AST  17  /  ALT  15  /  AlkPhos  134<H>  10-30                RADIOLOGY & ADDITIONAL TESTS:    Imaging Personally Reviewed:    Electrocardiogram Personally Reviewed:    COORDINATION OF CARE:  Care Discussed with Consultants/Other Providers [Y/N]:  Prior or Outpatient Records Reviewed [Y/N]:     Patient is a 67y old  Female who presents with a chief complaint of hypertension (31 Oct 2022 05:41)      SUBJECTIVE / OVERNIGHT EVENTS:  Patient requests that her son translates for her in English     67F with pmhx DM, HTN and HLD presents with hypertension and tested positive for COVID w headache, cp, sob, chills and muscle aches.  In the ED, /99, otherwise unremarkable. Labs remarkable for K 3.4, glucose 240, alk phos 134, COVID +. Imaging included CT head non con revealed indeterminate age lacunar infarct involving left ganglio-capsular region. Chest xray neg. Patient given 1 LR, amlodipine 2.5mg, Tylenol and decadron. Patient later hypoxic to 92% on RA and placed on 4LNC.      ADDITIONAL REVIEW OF SYSTEMS: Negative except for above    MEDICATIONS  (STANDING):  amLODIPine   Tablet 5 milliGRAM(s) Oral daily  dexAMETHasone     Tablet 6 milliGRAM(s) Oral daily  dextrose 5%. 1000 milliLiter(s) (50 mL/Hr) IV Continuous <Continuous>  dextrose 5%. 1000 milliLiter(s) (100 mL/Hr) IV Continuous <Continuous>  dextrose 50% Injectable 25 Gram(s) IV Push once  dextrose 50% Injectable 12.5 Gram(s) IV Push once  dextrose 50% Injectable 25 Gram(s) IV Push once  enoxaparin Injectable 40 milliGRAM(s) SubCutaneous every 24 hours  glucagon  Injectable 1 milliGRAM(s) IntraMuscular once  insulin glargine Injectable (LANTUS) 5 Unit(s) SubCutaneous at bedtime  insulin lispro (ADMELOG) corrective regimen sliding scale   SubCutaneous three times a day before meals  insulin lispro (ADMELOG) corrective regimen sliding scale   SubCutaneous at bedtime    MEDICATIONS  (PRN):  hydrALAZINE 10 milliGRAM(s) Oral once PRN Systolic blood pressure >160      CAPILLARY BLOOD GLUCOSE      POCT Blood Glucose.: 267 mg/dL (31 Oct 2022 09:51)    I&O's Summary    30 Oct 2022 07:01  -  31 Oct 2022 07:00  --------------------------------------------------------  IN: 200 mL / OUT: 0 mL / NET: 200 mL        PHYSICAL EXAM:  Vital Signs Last 24 Hrs  T(C): 36.8 (31 Oct 2022 08:40), Max: 37.7 (30 Oct 2022 18:43)  T(F): 98.2 (31 Oct 2022 08:40), Max: 99.8 (30 Oct 2022 18:43)  HR: 86 (31 Oct 2022 08:40) (82 - 100)  BP: 152/81 (31 Oct 2022 08:40) (152/81 - 202/99)  BP(mean): 112 (31 Oct 2022 01:45) (112 - 112)  RR: 16 (31 Oct 2022 08:40) (16 - 19)  SpO2: 93% (31 Oct 2022 08:40) (92% - 97%)    Parameters below as of 31 Oct 2022 08:40  Patient On (Oxygen Delivery Method): room air        PHYSICAL EXAM:  GENERAL: NAD, well-developed  HEAD:  Atraumatic, Normocephalic  EYES:  conjunctiva and sclera clear  NECK: Supple, No JVD  CHEST/LUNG: Clear to auscultation bilaterally; No wheeze  HEART: Regular rate and rhythm; No murmurs, rubs, or gallops  ABDOMEN: Soft, Nontender, Nondistended; Bowel sounds present  EXTREMITIES:  2+ Peripheral Pulses, No clubbing, cyanosis, or edema  PSYCH: AAOx3  NEUROLOGY: non-focal        LABS:                        14.5   10.26 )-----------( 203      ( 30 Oct 2022 23:18 )             43.2     10-30    138  |  101  |  11  ----------------------------<  240<H>  3.4<L>   |  25  |  0.45<L>    Ca    9.0      30 Oct 2022 23:18  Mg     2.1     10-30    TPro  7.8  /  Alb  4.5  /  TBili  0.7  /  DBili  x   /  AST  17  /  ALT  15  /  AlkPhos  134<H>  10-30                RADIOLOGY & ADDITIONAL TESTS:    Imaging Personally Reviewed:    Electrocardiogram Personally Reviewed:    COORDINATION OF CARE:  Care Discussed with Consultants/Other Providers [Y/N]:  Prior or Outpatient Records Reviewed [Y/N]:

## 2022-11-01 LAB
ALBUMIN SERPL ELPH-MCNC: 4 G/DL — SIGNIFICANT CHANGE UP (ref 3.3–5)
ALP SERPL-CCNC: 118 U/L — SIGNIFICANT CHANGE UP (ref 40–120)
ALT FLD-CCNC: 16 U/L — SIGNIFICANT CHANGE UP (ref 10–45)
ANION GAP SERPL CALC-SCNC: 12 MMOL/L — SIGNIFICANT CHANGE UP (ref 5–17)
AST SERPL-CCNC: 17 U/L — SIGNIFICANT CHANGE UP (ref 10–40)
BASOPHILS # BLD AUTO: 0.03 K/UL — SIGNIFICANT CHANGE UP (ref 0–0.2)
BASOPHILS NFR BLD AUTO: 0.3 % — SIGNIFICANT CHANGE UP (ref 0–2)
BILIRUB SERPL-MCNC: 0.5 MG/DL — SIGNIFICANT CHANGE UP (ref 0.2–1.2)
BUN SERPL-MCNC: 16 MG/DL — SIGNIFICANT CHANGE UP (ref 7–23)
CALCIUM SERPL-MCNC: 8.7 MG/DL — SIGNIFICANT CHANGE UP (ref 8.4–10.5)
CHLORIDE SERPL-SCNC: 100 MMOL/L — SIGNIFICANT CHANGE UP (ref 96–108)
CO2 SERPL-SCNC: 25 MMOL/L — SIGNIFICANT CHANGE UP (ref 22–31)
CREAT SERPL-MCNC: 0.49 MG/DL — LOW (ref 0.5–1.3)
EGFR: 105 ML/MIN/1.73M2 — SIGNIFICANT CHANGE UP
EOSINOPHIL # BLD AUTO: 0 K/UL — SIGNIFICANT CHANGE UP (ref 0–0.5)
EOSINOPHIL NFR BLD AUTO: 0 % — SIGNIFICANT CHANGE UP (ref 0–6)
GLUCOSE BLDC GLUCOMTR-MCNC: 258 MG/DL — HIGH (ref 70–99)
GLUCOSE BLDC GLUCOMTR-MCNC: 281 MG/DL — HIGH (ref 70–99)
GLUCOSE BLDC GLUCOMTR-MCNC: 306 MG/DL — HIGH (ref 70–99)
GLUCOSE BLDC GLUCOMTR-MCNC: 355 MG/DL — HIGH (ref 70–99)
GLUCOSE BLDC GLUCOMTR-MCNC: 381 MG/DL — HIGH (ref 70–99)
GLUCOSE SERPL-MCNC: 252 MG/DL — HIGH (ref 70–99)
HCT VFR BLD CALC: 42.1 % — SIGNIFICANT CHANGE UP (ref 34.5–45)
HCV AB S/CO SERPL IA: 0.17 S/CO — SIGNIFICANT CHANGE UP (ref 0–0.99)
HCV AB SERPL-IMP: SIGNIFICANT CHANGE UP
HGB BLD-MCNC: 14.3 G/DL — SIGNIFICANT CHANGE UP (ref 11.5–15.5)
IMM GRANULOCYTES NFR BLD AUTO: 0.5 % — SIGNIFICANT CHANGE UP (ref 0–0.9)
LYMPHOCYTES # BLD AUTO: 2.7 K/UL — SIGNIFICANT CHANGE UP (ref 1–3.3)
LYMPHOCYTES # BLD AUTO: 25.3 % — SIGNIFICANT CHANGE UP (ref 13–44)
MCHC RBC-ENTMCNC: 29.7 PG — SIGNIFICANT CHANGE UP (ref 27–34)
MCHC RBC-ENTMCNC: 34 GM/DL — SIGNIFICANT CHANGE UP (ref 32–36)
MCV RBC AUTO: 87.3 FL — SIGNIFICANT CHANGE UP (ref 80–100)
MONOCYTES # BLD AUTO: 0.94 K/UL — HIGH (ref 0–0.9)
MONOCYTES NFR BLD AUTO: 8.8 % — SIGNIFICANT CHANGE UP (ref 2–14)
NEUTROPHILS # BLD AUTO: 6.96 K/UL — SIGNIFICANT CHANGE UP (ref 1.8–7.4)
NEUTROPHILS NFR BLD AUTO: 65.1 % — SIGNIFICANT CHANGE UP (ref 43–77)
NRBC # BLD: 0 /100 WBCS — SIGNIFICANT CHANGE UP (ref 0–0)
PLATELET # BLD AUTO: 211 K/UL — SIGNIFICANT CHANGE UP (ref 150–400)
POTASSIUM SERPL-MCNC: 3.7 MMOL/L — SIGNIFICANT CHANGE UP (ref 3.5–5.3)
POTASSIUM SERPL-SCNC: 3.7 MMOL/L — SIGNIFICANT CHANGE UP (ref 3.5–5.3)
PROT SERPL-MCNC: 7.4 G/DL — SIGNIFICANT CHANGE UP (ref 6–8.3)
RBC # BLD: 4.82 M/UL — SIGNIFICANT CHANGE UP (ref 3.8–5.2)
RBC # FLD: 12.1 % — SIGNIFICANT CHANGE UP (ref 10.3–14.5)
SODIUM SERPL-SCNC: 137 MMOL/L — SIGNIFICANT CHANGE UP (ref 135–145)
WBC # BLD: 10.68 K/UL — HIGH (ref 3.8–10.5)
WBC # FLD AUTO: 10.68 K/UL — HIGH (ref 3.8–10.5)

## 2022-11-01 PROCEDURE — 99233 SBSQ HOSP IP/OBS HIGH 50: CPT

## 2022-11-01 RX ORDER — INSULIN LISPRO 100/ML
4 VIAL (ML) SUBCUTANEOUS
Refills: 0 | Status: DISCONTINUED | OUTPATIENT
Start: 2022-11-01 | End: 2022-11-02

## 2022-11-01 RX ORDER — INSULIN LISPRO 100/ML
VIAL (ML) SUBCUTANEOUS
Refills: 0 | Status: DISCONTINUED | OUTPATIENT
Start: 2022-11-01 | End: 2022-11-04

## 2022-11-01 RX ORDER — INSULIN LISPRO 100/ML
VIAL (ML) SUBCUTANEOUS AT BEDTIME
Refills: 0 | Status: DISCONTINUED | OUTPATIENT
Start: 2022-11-01 | End: 2022-11-04

## 2022-11-01 RX ORDER — INSULIN GLARGINE 100 [IU]/ML
10 INJECTION, SOLUTION SUBCUTANEOUS AT BEDTIME
Refills: 0 | Status: DISCONTINUED | OUTPATIENT
Start: 2022-11-01 | End: 2022-11-02

## 2022-11-01 RX ORDER — AMLODIPINE BESYLATE 2.5 MG/1
2.5 TABLET ORAL ONCE
Refills: 0 | Status: COMPLETED | OUTPATIENT
Start: 2022-11-01 | End: 2022-11-01

## 2022-11-01 RX ORDER — AMLODIPINE BESYLATE 2.5 MG/1
7.5 TABLET ORAL DAILY
Refills: 0 | Status: DISCONTINUED | OUTPATIENT
Start: 2022-11-02 | End: 2022-11-04

## 2022-11-01 RX ADMIN — Medication 100 MILLIGRAM(S): at 06:01

## 2022-11-01 RX ADMIN — Medication 6 MILLIGRAM(S): at 06:01

## 2022-11-01 RX ADMIN — Medication 4 UNIT(S): at 18:05

## 2022-11-01 RX ADMIN — Medication 5: at 12:55

## 2022-11-01 RX ADMIN — AMLODIPINE BESYLATE 2.5 MILLIGRAM(S): 2.5 TABLET ORAL at 15:01

## 2022-11-01 RX ADMIN — Medication 100 MILLIGRAM(S): at 14:51

## 2022-11-01 RX ADMIN — Medication 3: at 09:11

## 2022-11-01 RX ADMIN — ENOXAPARIN SODIUM 40 MILLIGRAM(S): 100 INJECTION SUBCUTANEOUS at 06:09

## 2022-11-01 RX ADMIN — Medication 4: at 18:04

## 2022-11-01 RX ADMIN — INSULIN GLARGINE 10 UNIT(S): 100 INJECTION, SOLUTION SUBCUTANEOUS at 22:22

## 2022-11-01 RX ADMIN — REMDESIVIR 500 MILLIGRAM(S): 5 INJECTION INTRAVENOUS at 17:47

## 2022-11-01 RX ADMIN — Medication 3: at 22:21

## 2022-11-01 RX ADMIN — AMLODIPINE BESYLATE 5 MILLIGRAM(S): 2.5 TABLET ORAL at 06:00

## 2022-11-01 NOTE — PROGRESS NOTE ADULT - SUBJECTIVE AND OBJECTIVE BOX
Patient is a 67y old  Female who presents with a chief complaint of hypertension (31 Oct 2022 09:57)      SUBJECTIVE / OVERNIGHT EVENTS:    T(F) Max: 99.1F  HR: 97 (86 - 101)  BP: 165/99  (159/97 - 186/88)  SpO2: 93%  (93% - 96%)  BP is elevated at times   NO current complaints .  Patient wants to go home       ADDITIONAL REVIEW OF SYSTEMS: Negative except for above    MEDICATIONS  (STANDING):  amLODIPine   Tablet 5 milliGRAM(s) Oral daily  benzonatate 100 milliGRAM(s) Oral every 8 hours  dexAMETHasone     Tablet 6 milliGRAM(s) Oral daily  dextrose 5%. 1000 milliLiter(s) (50 mL/Hr) IV Continuous <Continuous>  dextrose 5%. 1000 milliLiter(s) (100 mL/Hr) IV Continuous <Continuous>  dextrose 50% Injectable 25 Gram(s) IV Push once  dextrose 50% Injectable 12.5 Gram(s) IV Push once  dextrose 50% Injectable 25 Gram(s) IV Push once  enoxaparin Injectable 40 milliGRAM(s) SubCutaneous every 24 hours  glucagon  Injectable 1 milliGRAM(s) IntraMuscular once  insulin glargine Injectable (LANTUS) 5 Unit(s) SubCutaneous at bedtime  insulin lispro (ADMELOG) corrective regimen sliding scale   SubCutaneous three times a day before meals  insulin lispro (ADMELOG) corrective regimen sliding scale   SubCutaneous at bedtime  remdesivir  IVPB   IV Intermittent   remdesivir  IVPB 100 milliGRAM(s) IV Intermittent every 24 hours    MEDICATIONS  (PRN):  hydrALAZINE 10 milliGRAM(s) Oral daily PRN Systolic blood pressure >170  hydrALAZINE 10 milliGRAM(s) Oral once PRN Systolic blood pressure >160      CAPILLARY BLOOD GLUCOSE      POCT Blood Glucose.: 355 mg/dL (01 Nov 2022 12:54)  POCT Blood Glucose.: 281 mg/dL (01 Nov 2022 09:02)  POCT Blood Glucose.: 258 mg/dL (01 Nov 2022 01:17)  POCT Blood Glucose.: 413 mg/dL (31 Oct 2022 21:34)  POCT Blood Glucose.: 245 mg/dL (31 Oct 2022 18:11)  POCT Blood Glucose.: 241 mg/dL (31 Oct 2022 13:04)    I&O's Summary    31 Oct 2022 07:01  -  01 Nov 2022 07:00  --------------------------------------------------------  IN: 980 mL / OUT: 702 mL / NET: 278 mL    01 Nov 2022 07:01  -  01 Nov 2022 12:56  --------------------------------------------------------  IN: 120 mL / OUT: 0 mL / NET: 120 mL        PHYSICAL EXAM:  Vital Signs Last 24 Hrs  T(C): 36.8 (01 Nov 2022 09:15), Max: 37.3 (31 Oct 2022 17:08)  T(F): 98.3 (01 Nov 2022 09:15), Max: 99.1 (31 Oct 2022 17:08)  HR: 97 (01 Nov 2022 09:15) (86 - 101)  BP: 165/99 (01 Nov 2022 09:15) (159/97 - 186/88)  BP(mean): --  RR: 18 (01 Nov 2022 09:15) (16 - 18)  SpO2: 93% (01 Nov 2022 09:15) (93% - 96%)    Parameters below as of 01 Nov 2022 09:15  Patient On (Oxygen Delivery Method): room air        PHYSICAL EXAM:  GENERAL: NAD, well-developed  HEAD:  Atraumatic, Normocephalic  EYES:  conjunctiva and sclera clear  NECK: Supple, No JVD  CHEST/LUNG: Clear to auscultation bilaterally; No wheeze  HEART: Regular rate and rhythm; No murmurs, rubs, or gallops  ABDOMEN: Soft, Nontender, Nondistended; Bowel sounds present  EXTREMITIES:  2+ Peripheral Pulses, No clubbing, cyanosis, or edema  PSYCH: AAOx3  NEUROLOGY: non-focal      LABS:                        14.3   10.68 )-----------( 211      ( 01 Nov 2022 06:34 )             42.1     11-01    137  |  100  |  16  ----------------------------<  252<H>  3.7   |  25  |  0.49<L>    Ca    8.7      01 Nov 2022 06:34  Mg     2.1     10-30    TPro  7.4  /  Alb  4.0  /  TBili  0.5  /  DBili  x   /  AST  17  /  ALT  16  /  AlkPhos  118  11-01                RADIOLOGY & ADDITIONAL TESTS:    Imaging Personally Reviewed:    Electrocardiogram Personally Reviewed:    COORDINATION OF CARE:  Care Discussed with Consultants/Other Providers [Y/N]:  Prior or Outpatient Records Reviewed [Y/N]:

## 2022-11-01 NOTE — PROGRESS NOTE ADULT - NSPROGADDITIONALINFOA_GEN_ALL_CORE
Patient WANTS to go home because she is worried about her finances as her deductible is so high as per son/  consult is placed       Mary Sharma   Hospitalist    /TEAMS

## 2022-11-01 NOTE — PROGRESS NOTE ADULT - PROBLEM SELECTOR PLAN 2
on 4 L nasal cannula ---> NOW on RA   cxr neg  neg DD  -CW  dexamethasone  - continue to monitor sats  - isolation  - monitor inflammatrory markers   - CW  Dexamethasone ( D2) , Patient does not want to stay in the hospital because of insurance issues , will discuss to see if she can stay for at least 3 days   - lovenox ppx on 4 L nasal cannula ---> NOW on RA   cxr neg  neg DD  -CW  dexamethasone  - continue to monitor sats  - isolation  - monitor inflammatrory markers   - CW  Dexamethasone ( D2) , Patient does not want to stay in the hospital because of insurance issues , will discuss to see if she can stay for at least 3 days , son now says it is ok to complete the 5 days   - lovenox ppx

## 2022-11-01 NOTE — PROGRESS NOTE ADULT - PROBLEM SELECTOR PLAN 1
CT head no acute ICH or acute pathology, but chronic microvascular ischemic changes and indeterminate age infarct.   c/o HA and mild blurry vision with no findings on exam  - S/P oral amlodipine 2.5mg  - amlodipine 5mg qd--> increased to 7.5 mg oral daily and monitor   - hydralazine 10mg q8 prn SBP>170

## 2022-11-01 NOTE — PROGRESS NOTE ADULT - PROBLEM SELECTOR PLAN 4
not on any home meds and NOT controlled   - A1c= 11   -  Start Admelog   - Increase lantus  - Endo/Nutritionist and DM educator consults

## 2022-11-02 LAB
ANION GAP SERPL CALC-SCNC: 14 MMOL/L — SIGNIFICANT CHANGE UP (ref 5–17)
BUN SERPL-MCNC: 22 MG/DL — SIGNIFICANT CHANGE UP (ref 7–23)
CALCIUM SERPL-MCNC: 8.9 MG/DL — SIGNIFICANT CHANGE UP (ref 8.4–10.5)
CHLORIDE SERPL-SCNC: 99 MMOL/L — SIGNIFICANT CHANGE UP (ref 96–108)
CHOLEST SERPL-MCNC: 239 MG/DL — HIGH
CO2 SERPL-SCNC: 21 MMOL/L — LOW (ref 22–31)
CREAT SERPL-MCNC: 0.49 MG/DL — LOW (ref 0.5–1.3)
EGFR: 105 ML/MIN/1.73M2 — SIGNIFICANT CHANGE UP
GLUCOSE BLDC GLUCOMTR-MCNC: 243 MG/DL — HIGH (ref 70–99)
GLUCOSE BLDC GLUCOMTR-MCNC: 270 MG/DL — HIGH (ref 70–99)
GLUCOSE BLDC GLUCOMTR-MCNC: 277 MG/DL — HIGH (ref 70–99)
GLUCOSE BLDC GLUCOMTR-MCNC: 278 MG/DL — HIGH (ref 70–99)
GLUCOSE SERPL-MCNC: 276 MG/DL — HIGH (ref 70–99)
HCT VFR BLD CALC: 42.4 % — SIGNIFICANT CHANGE UP (ref 34.5–45)
HDLC SERPL-MCNC: 59 MG/DL — SIGNIFICANT CHANGE UP
HGB BLD-MCNC: 14.4 G/DL — SIGNIFICANT CHANGE UP (ref 11.5–15.5)
LIPID PNL WITH DIRECT LDL SERPL: 164 MG/DL — HIGH
MCHC RBC-ENTMCNC: 29.8 PG — SIGNIFICANT CHANGE UP (ref 27–34)
MCHC RBC-ENTMCNC: 34 GM/DL — SIGNIFICANT CHANGE UP (ref 32–36)
MCV RBC AUTO: 87.8 FL — SIGNIFICANT CHANGE UP (ref 80–100)
NON HDL CHOLESTEROL: 180 MG/DL — HIGH
NRBC # BLD: 0 /100 WBCS — SIGNIFICANT CHANGE UP (ref 0–0)
PLATELET # BLD AUTO: 199 K/UL — SIGNIFICANT CHANGE UP (ref 150–400)
POTASSIUM SERPL-MCNC: 4.4 MMOL/L — SIGNIFICANT CHANGE UP (ref 3.5–5.3)
POTASSIUM SERPL-SCNC: 4.4 MMOL/L — SIGNIFICANT CHANGE UP (ref 3.5–5.3)
RBC # BLD: 4.83 M/UL — SIGNIFICANT CHANGE UP (ref 3.8–5.2)
RBC # FLD: 12.1 % — SIGNIFICANT CHANGE UP (ref 10.3–14.5)
SODIUM SERPL-SCNC: 134 MMOL/L — LOW (ref 135–145)
TRIGL SERPL-MCNC: 79 MG/DL — SIGNIFICANT CHANGE UP
WBC # BLD: 12.19 K/UL — HIGH (ref 3.8–10.5)
WBC # FLD AUTO: 12.19 K/UL — HIGH (ref 3.8–10.5)

## 2022-11-02 PROCEDURE — 99233 SBSQ HOSP IP/OBS HIGH 50: CPT

## 2022-11-02 PROCEDURE — 99222 1ST HOSP IP/OBS MODERATE 55: CPT

## 2022-11-02 PROCEDURE — 93971 EXTREMITY STUDY: CPT | Mod: 26,RT

## 2022-11-02 RX ORDER — INSULIN LISPRO 100/ML
9 VIAL (ML) SUBCUTANEOUS
Refills: 0 | Status: DISCONTINUED | OUTPATIENT
Start: 2022-11-02 | End: 2022-11-03

## 2022-11-02 RX ORDER — CHLORHEXIDINE GLUCONATE 213 G/1000ML
1 SOLUTION TOPICAL DAILY
Refills: 0 | Status: DISCONTINUED | OUTPATIENT
Start: 2022-11-02 | End: 2022-11-04

## 2022-11-02 RX ORDER — INSULIN GLARGINE 100 [IU]/ML
14 INJECTION, SOLUTION SUBCUTANEOUS AT BEDTIME
Refills: 0 | Status: DISCONTINUED | OUTPATIENT
Start: 2022-11-02 | End: 2022-11-03

## 2022-11-02 RX ORDER — INSULIN LISPRO 100/ML
6 VIAL (ML) SUBCUTANEOUS
Refills: 0 | Status: DISCONTINUED | OUTPATIENT
Start: 2022-11-02 | End: 2022-11-02

## 2022-11-02 RX ADMIN — INSULIN GLARGINE 14 UNIT(S): 100 INJECTION, SOLUTION SUBCUTANEOUS at 21:36

## 2022-11-02 RX ADMIN — REMDESIVIR 500 MILLIGRAM(S): 5 INJECTION INTRAVENOUS at 17:38

## 2022-11-02 RX ADMIN — Medication 6 UNIT(S): at 17:42

## 2022-11-02 RX ADMIN — Medication 6: at 08:54

## 2022-11-02 RX ADMIN — Medication 2: at 21:36

## 2022-11-02 RX ADMIN — ENOXAPARIN SODIUM 40 MILLIGRAM(S): 100 INJECTION SUBCUTANEOUS at 05:53

## 2022-11-02 RX ADMIN — Medication 6 MILLIGRAM(S): at 05:53

## 2022-11-02 RX ADMIN — Medication 6: at 17:41

## 2022-11-02 RX ADMIN — AMLODIPINE BESYLATE 7.5 MILLIGRAM(S): 2.5 TABLET ORAL at 05:54

## 2022-11-02 RX ADMIN — Medication 6 UNIT(S): at 12:54

## 2022-11-02 RX ADMIN — Medication 4 UNIT(S): at 08:54

## 2022-11-02 RX ADMIN — Medication 4: at 12:53

## 2022-11-02 NOTE — DIETITIAN INITIAL EVALUATION ADULT - REASON INDICATOR FOR ASSESSMENT
Consult received for nutrition assessment   Information obtained from pt, pt's son, electronic medical record   Pt speaks Cantonese but son speaks english and was able to translate

## 2022-11-02 NOTE — PROGRESS NOTE ADULT - SUBJECTIVE AND OBJECTIVE BOX
Patient is a 67y old  Female who presents with a chief complaint of Primary hypertension  "67F with PMH DM2, HTN and HLD presents with hypertension and COVID concerning for pneumonia."     (02 Nov 2022 14:21)      SUBJECTIVE / OVERNIGHT EVENTS:    Tele reviewed:       ADDITIONAL REVIEW OF SYSTEMS: Negative except for above    MEDICATIONS  (STANDING):  amLODIPine   Tablet 7.5 milliGRAM(s) Oral daily  dexAMETHasone     Tablet 6 milliGRAM(s) Oral daily  dextrose 5%. 1000 milliLiter(s) (50 mL/Hr) IV Continuous <Continuous>  dextrose 5%. 1000 milliLiter(s) (100 mL/Hr) IV Continuous <Continuous>  dextrose 50% Injectable 25 Gram(s) IV Push once  dextrose 50% Injectable 12.5 Gram(s) IV Push once  dextrose 50% Injectable 25 Gram(s) IV Push once  enoxaparin Injectable 40 milliGRAM(s) SubCutaneous every 24 hours  glucagon  Injectable 1 milliGRAM(s) IntraMuscular once  insulin glargine Injectable (LANTUS) 14 Unit(s) SubCutaneous at bedtime  insulin lispro (ADMELOG) corrective regimen sliding scale   SubCutaneous three times a day before meals  insulin lispro (ADMELOG) corrective regimen sliding scale   SubCutaneous at bedtime  insulin lispro Injectable (ADMELOG) 6 Unit(s) SubCutaneous three times a day before meals  remdesivir  IVPB   IV Intermittent   remdesivir  IVPB 100 milliGRAM(s) IV Intermittent every 24 hours    MEDICATIONS  (PRN):  hydrALAZINE 10 milliGRAM(s) Oral daily PRN Systolic blood pressure >170  hydrALAZINE 10 milliGRAM(s) Oral once PRN Systolic blood pressure >160      CAPILLARY BLOOD GLUCOSE      POCT Blood Glucose.: 243 mg/dL (02 Nov 2022 12:47)  POCT Blood Glucose.: 277 mg/dL (02 Nov 2022 08:53)  POCT Blood Glucose.: 381 mg/dL (01 Nov 2022 22:18)  POCT Blood Glucose.: 306 mg/dL (01 Nov 2022 18:02)    I&O's Summary    01 Nov 2022 07:01  -  02 Nov 2022 07:00  --------------------------------------------------------  IN: 1300 mL / OUT: 0 mL / NET: 1300 mL    02 Nov 2022 07:01  -  02 Nov 2022 14:58  --------------------------------------------------------  IN: 600 mL / OUT: 0 mL / NET: 600 mL        PHYSICAL EXAM:  Vital Signs Last 24 Hrs  T(C): 36.8 (02 Nov 2022 12:50), Max: 37.2 (01 Nov 2022 22:20)  T(F): 98.3 (02 Nov 2022 12:50), Max: 98.9 (01 Nov 2022 22:20)  HR: 82 (02 Nov 2022 12:50) (74 - 90)  BP: 145/73 (02 Nov 2022 12:50) (145/73 - 165/94)  BP(mean): --  RR: 18 (02 Nov 2022 12:50) (18 - 18)  SpO2: 94% (02 Nov 2022 12:50) (93% - 96%)    Parameters below as of 02 Nov 2022 12:50  Patient On (Oxygen Delivery Method): room air        PHYSICAL EXAM:  GENERAL: NAD, well-developed  HEAD:  Atraumatic, Normocephalic  EYES:  conjunctiva and sclera clear  NECK: Supple, No JVD  CHEST/LUNG: Clear to auscultation bilaterally; No wheeze  HEART: Regular rate and rhythm; No murmurs, rubs, or gallops  ABDOMEN: Soft, Nontender, Nondistended; Bowel sounds present  EXTREMITIES:  2+ Peripheral Pulses, No clubbing, cyanosis, or edema  PSYCH: AAOx3  NEUROLOGY: non-focal  SKIN: No rashes or lesions      LABS:                        14.4   12.19 )-----------( 199      ( 02 Nov 2022 06:44 )             42.4     11-02    134<L>  |  99  |  22  ----------------------------<  276<H>  4.4   |  21<L>  |  0.49<L>    Ca    8.9      02 Nov 2022 06:44    TPro  7.4  /  Alb  4.0  /  TBili  0.5  /  DBili  x   /  AST  17  /  ALT  16  /  AlkPhos  118  11-01                RADIOLOGY & ADDITIONAL TESTS:    Imaging Personally Reviewed:    Electrocardiogram Personally Reviewed:    COORDINATION OF CARE:  Care Discussed with Consultants/Other Providers [Y/N]:  Prior or Outpatient Records Reviewed [Y/N]:     Patient is a 67y old  Female who presents with a chief complaint of Primary hypertension  "67F with PMH DM2, HTN and HLD presents with hypertension and COVID concerning for pneumonia."     (02 Nov 2022 14:21)      SUBJECTIVE / OVERNIGHT EVENTS:   Patient feels better today and no SOB.        ADDITIONAL REVIEW OF SYSTEMS: Negative except for above    MEDICATIONS  (STANDING):  amLODIPine   Tablet 7.5 milliGRAM(s) Oral daily  dexAMETHasone     Tablet 6 milliGRAM(s) Oral daily  dextrose 5%. 1000 milliLiter(s) (50 mL/Hr) IV Continuous <Continuous>  dextrose 5%. 1000 milliLiter(s) (100 mL/Hr) IV Continuous <Continuous>  dextrose 50% Injectable 25 Gram(s) IV Push once  dextrose 50% Injectable 12.5 Gram(s) IV Push once  dextrose 50% Injectable 25 Gram(s) IV Push once  enoxaparin Injectable 40 milliGRAM(s) SubCutaneous every 24 hours  glucagon  Injectable 1 milliGRAM(s) IntraMuscular once  insulin glargine Injectable (LANTUS) 14 Unit(s) SubCutaneous at bedtime  insulin lispro (ADMELOG) corrective regimen sliding scale   SubCutaneous three times a day before meals  insulin lispro (ADMELOG) corrective regimen sliding scale   SubCutaneous at bedtime  insulin lispro Injectable (ADMELOG) 6 Unit(s) SubCutaneous three times a day before meals  remdesivir  IVPB   IV Intermittent   remdesivir  IVPB 100 milliGRAM(s) IV Intermittent every 24 hours    MEDICATIONS  (PRN):  hydrALAZINE 10 milliGRAM(s) Oral daily PRN Systolic blood pressure >170  hydrALAZINE 10 milliGRAM(s) Oral once PRN Systolic blood pressure >160      CAPILLARY BLOOD GLUCOSE      POCT Blood Glucose.: 243 mg/dL (02 Nov 2022 12:47)  POCT Blood Glucose.: 277 mg/dL (02 Nov 2022 08:53)  POCT Blood Glucose.: 381 mg/dL (01 Nov 2022 22:18)  POCT Blood Glucose.: 306 mg/dL (01 Nov 2022 18:02)    I&O's Summary    01 Nov 2022 07:01  -  02 Nov 2022 07:00  --------------------------------------------------------  IN: 1300 mL / OUT: 0 mL / NET: 1300 mL    02 Nov 2022 07:01  -  02 Nov 2022 14:58  --------------------------------------------------------  IN: 600 mL / OUT: 0 mL / NET: 600 mL        PHYSICAL EXAM:  Vital Signs Last 24 Hrs  T(C): 36.8 (02 Nov 2022 12:50), Max: 37.2 (01 Nov 2022 22:20)  T(F): 98.3 (02 Nov 2022 12:50), Max: 98.9 (01 Nov 2022 22:20)  HR: 82 (02 Nov 2022 12:50) (74 - 90)  BP: 145/73 (02 Nov 2022 12:50) (145/73 - 165/94)  BP(mean): --  RR: 18 (02 Nov 2022 12:50) (18 - 18)  SpO2: 94% (02 Nov 2022 12:50) (93% - 96%)    Parameters below as of 02 Nov 2022 12:50  Patient On (Oxygen Delivery Method): room air        PHYSICAL EXAM:  GENERAL: NAD, well-developed  HEAD:  Atraumatic, Normocephalic  EYES:  conjunctiva and sclera clear  NECK: Supple, No JVD  CHEST/LUNG: Clear to auscultation bilaterally; No wheeze  HEART: Regular rate and rhythm; No murmurs, rubs, or gallops  ABDOMEN: Soft, Nontender, Nondistended; Bowel sounds present  EXTREMITIES:  2+ Peripheral Pulses, No clubbing, cyanosis, or edema  PSYCH: AAOx3  NEUROLOGY: non-focal      LABS:                        14.4   12.19 )-----------( 199      ( 02 Nov 2022 06:44 )             42.4     11-02    134<L>  |  99  |  22  ----------------------------<  276<H>  4.4   |  21<L>  |  0.49<L>    Ca    8.9      02 Nov 2022 06:44    TPro  7.4  /  Alb  4.0  /  TBili  0.5  /  DBili  x   /  AST  17  /  ALT  16  /  AlkPhos  118  11-01                RADIOLOGY & ADDITIONAL TESTS:    Imaging Personally Reviewed:    Electrocardiogram Personally Reviewed:    COORDINATION OF CARE:  Care Discussed with Consultants/Other Providers [Y/N]:  Prior or Outpatient Records Reviewed [Y/N]:

## 2022-11-02 NOTE — DIETITIAN INITIAL EVALUATION ADULT - NSFNSGIASSESSMENTFT_GEN_A_CORE
- Pt denies nausea, vomiting, diarrhea, or constipation at this time   - Last BM:11/1 per pt's report; reports usually BM few times/day; not currently ordered for bowel regimen

## 2022-11-02 NOTE — DIETITIAN INITIAL EVALUATION ADULT - REASON FOR ADMISSION
Primary hypertension  "67F with PMH DM2, HTN and HLD presents with hypertension and COVID concerning for pneumonia."

## 2022-11-02 NOTE — PROGRESS NOTE ADULT - NSPROGADDITIONALINFOA_GEN_ALL_CORE
Son and patient have Many questions which I tried to answer to their satisfactions      Mary Sharma   Hospitalist    /TEAMS

## 2022-11-02 NOTE — PROVIDER CONTACT NOTE (OTHER) - BACKGROUND
Pt admitted with uncontrolled BP
Pt. admitted for Covid. History of DM2 and HTN
Pt presented to the ED w/ elevated BP of 202/99 and (+) COVID

## 2022-11-02 NOTE — PROGRESS NOTE ADULT - PROBLEM SELECTOR PLAN 1
CT head no acute ICH or acute pathology, but chronic microvascular ischemic changes and indeterminate age infarct.   c/o HA and mild blurry vision with no findings on exam  - S/P oral amlodipine 2.5mg  - amlodipine 5mg qd--> CW Norvasc 7.5 mg oral daily and monitor   - hydralazine 10mg q8 prn SBP>170

## 2022-11-02 NOTE — CONSULT NOTE ADULT - ASSESSMENT
JESSICA TOMPKINS is a 67y old Cantonese Speaking woman, with history of Type 2 DM, HTN, HLD, presenting with hypertensive urgency, found to have COVID infection, on steroid treatment.  Endocrine consulted for uncontrolled Type 2 DM with hyperglycemia.      Patient is high risk with high level decision making due to uncontrolled diabetes which places patient at high risk for cardiovascular and cerebrovascular events. Patient with lability of glucose requiring close monitoring and insulin adjustments.    1. Type 2 DM  A1C:A1C with Estimated Average Glucose Result: 11.0 % (10-31-22 @ 11:38)  Home regimen: None  Endocrionlogist: None  Currently on dexamethasone  EGFR: eGFR: 105 mL/min/1.73m2 (11-02-22 @ 06:44)  Discussed with patient the importance of Carb consistent diet and exercise as tolerated.    Recommend nutritional consultation  Recommend DM education through RN staff (see physical to RN order)  Discussed glycemic goal of a1c <7% to prevent microvascular complications of diabetes mellitus and reduce the risk of macrovascular complications.   Recommend annual podiatry and ophthalmology follow up.   Glucose goal of 80-180mg/dl while in patient.   Recommend Lantus  14 units at bedtime  Recommend Admelog 9 units tid with meals  Recommend MDSS (2:50 above 150mg/dl) and night time low does sliding scale (2:50 above 250mg/dl) for hyperglycemia corrections    Discharge recommendation/considerations:  Metformin 1000mg bid  Metformin: Week 1- 500mg po bid, week 2: 500mg po qam/1000mg po pqm, and week 3: 1000mg po bid   Basal insulin, does to be determined, can switch to GLP1RA outpatient after eye exam.     2. HTN  BP goal <130/80  Continue with current regimen ]    3. HLD  LDL goal <70gm/dl   Check fasting lipid   Consider statin therapy

## 2022-11-02 NOTE — DIETITIAN INITIAL EVALUATION ADULT - PERTINENT MEDS FT
MEDICATIONS  (STANDING):  amLODIPine   Tablet 7.5 milliGRAM(s) Oral daily  dexAMETHasone     Tablet 6 milliGRAM(s) Oral daily  dextrose 5%. 1000 milliLiter(s) (50 mL/Hr) IV Continuous <Continuous>  dextrose 5%. 1000 milliLiter(s) (100 mL/Hr) IV Continuous <Continuous>  dextrose 50% Injectable 25 Gram(s) IV Push once  dextrose 50% Injectable 12.5 Gram(s) IV Push once  dextrose 50% Injectable 25 Gram(s) IV Push once  enoxaparin Injectable 40 milliGRAM(s) SubCutaneous every 24 hours  glucagon  Injectable 1 milliGRAM(s) IntraMuscular once  insulin glargine Injectable (LANTUS) 14 Unit(s) SubCutaneous at bedtime  insulin lispro (ADMELOG) corrective regimen sliding scale   SubCutaneous three times a day before meals  insulin lispro (ADMELOG) corrective regimen sliding scale   SubCutaneous at bedtime  insulin lispro Injectable (ADMELOG) 6 Unit(s) SubCutaneous three times a day before meals  remdesivir  IVPB   IV Intermittent   remdesivir  IVPB 100 milliGRAM(s) IV Intermittent every 24 hours    MEDICATIONS  (PRN):  hydrALAZINE 10 milliGRAM(s) Oral daily PRN Systolic blood pressure >170  hydrALAZINE 10 milliGRAM(s) Oral once PRN Systolic blood pressure >160

## 2022-11-02 NOTE — PROGRESS NOTE ADULT - PROBLEM SELECTOR PLAN 2
on 4 L nasal cannula ---> NOW on RA --> perform 6 mns walk and monitor SPO2  cxr neg  neg DD  -CW  dexamethasone for 10 days total   - continue to monitor sats  - isolation  - monitor inflammatrory markers   - CW  Dexamethasone ( D2) , Patient does not want to stay in the hospital because of insurance issues , will discuss to see if she can stay for at least 3 days , son now says it is ok to complete the 5 days   - lovenox ppx

## 2022-11-02 NOTE — DIETITIAN INITIAL EVALUATION ADULT - LITERATURE/VIDEOS GIVEN
Carbohydrate Counting for People with Diabetes, Diabetes Label Reading Nutrition Tips   Heart Healthy Reduced Sodium Nutrition Therapy, Heart Healthy Label Reading Nutrition Tips

## 2022-11-02 NOTE — PROGRESS NOTE ADULT - PROBLEM SELECTOR PLAN 4
not on any home meds and NOT controlled   - A1c= 11   -  Icrease Admelog   - Increase lantus  - Endo/Nutritionist and DM educator consults

## 2022-11-02 NOTE — CONSULT NOTE ADULT - SUBJECTIVE AND OBJECTIVE BOX
HPI:  67F with pmhx DM, HTN and HLD presents with hypertension and tested positive for COVID w headache, cp, sob, chills and muscle aches. Pt states she took her bp at home several times and it was high (up to 185/105 at home). She reports that she also tested herself at home for covid today and was has been feeling sick for the past few days. Ot note, patient adds that yesterday the cp and sob got worse at night. Per son pt took tylenol yesterday and has been c/o for the past few days of mild blurry vision b/l. Per son, patient has not seen a PMD in over three years, but was told in the past that she has HTN and DM and was told her to take meds but she did not want to.  Patient denies fevers, numbness, tingling, N/V/D. Patient states that she was vaccinated and boosted against COVID, and does not have a cardiologist. Patient lives at home with  and son. Son adds that patient is normally independent but is not very active at home.    In the ED, /99, otherwise unremarkable. Labs remarkable for K 3.4, glucose 240, alk phos 134, COVID +. Imaging included CT head non con revealed indeterminate age lacunar infarct involving left ganglio-capsular region. Chest xray neg. Patient given 1 LR, amlodipine 2.5mg, Tylenol and decadron. Patient later hypoxic to 92% on RA and placed on 4LNC.    PCP: Marin Yip (31 Oct 2022 05:41)      PAST MEDICAL & SURGICAL HISTORY:  No pertinent past medical history      No significant past surgical history          FAMILY HISTORY:      Social History:    Home Medications:        MEDICATIONS  (STANDING):  amLODIPine   Tablet 7.5 milliGRAM(s) Oral daily  dexAMETHasone     Tablet 6 milliGRAM(s) Oral daily  dextrose 5%. 1000 milliLiter(s) (50 mL/Hr) IV Continuous <Continuous>  dextrose 5%. 1000 milliLiter(s) (100 mL/Hr) IV Continuous <Continuous>  dextrose 50% Injectable 25 Gram(s) IV Push once  dextrose 50% Injectable 12.5 Gram(s) IV Push once  dextrose 50% Injectable 25 Gram(s) IV Push once  enoxaparin Injectable 40 milliGRAM(s) SubCutaneous every 24 hours  glucagon  Injectable 1 milliGRAM(s) IntraMuscular once  insulin glargine Injectable (LANTUS) 14 Unit(s) SubCutaneous at bedtime  insulin lispro (ADMELOG) corrective regimen sliding scale   SubCutaneous three times a day before meals  insulin lispro (ADMELOG) corrective regimen sliding scale   SubCutaneous at bedtime  insulin lispro Injectable (ADMELOG) 6 Unit(s) SubCutaneous three times a day before meals  remdesivir  IVPB   IV Intermittent   remdesivir  IVPB 100 milliGRAM(s) IV Intermittent every 24 hours    MEDICATIONS  (PRN):  hydrALAZINE 10 milliGRAM(s) Oral daily PRN Systolic blood pressure >170  hydrALAZINE 10 milliGRAM(s) Oral once PRN Systolic blood pressure >160      Allergies    No Known Allergies    Review of Systems:  Constitutional: No fever  Eyes: No blurry vision  Neuro: No tremors  HEENT: No pain  Cardiovascular: No chest pain, palpitations  Respiratory: No SOB, no cough  GI: No nausea, vomiting, abdominal pain  : No dysuria  Skin: no rash  Psych: no depression  Endocrine: no polyuria, polydipsia  Hem/lymph: no swelling  Osteoporosis: no fractures    ALL OTHER SYSTEMS REVIEWED AND NEGATIVE    UNABLE TO OBTAIN    PHYSICAL EXAM:  -----------------------------  VITALS: T(C): 36.8 (11-02-22 @ 08:50)  T(F): 98.3 (11-02-22 @ 08:50), Max: 98.9 (11-01-22 @ 22:20)  HR: 81 (11-02-22 @ 08:50) (74 - 90)  BP: 152/79 (11-02-22 @ 08:50) (152/79 - 165/94)  RR:  (18 - 18)  SpO2:  (93% - 96%)  Wt(kg): --  GENERAL: NAD, well-groomed, well-developed  EYES: No proptosis, no lid lag, anicteric  HEENT:  Atraumatic, Normocephalic, moist mucous membranes  THYROID: Normal size, no palpable nodules  RESPIRATORY: Clear to auscultation bilaterally; No rales, rhonchi, wheezing, or rubs  CARDIOVASCULAR: Regular rate and rhythm; No murmurs; no peripheral edema  GI: Soft, nontender, non distended, normal bowel sounds  SKIN: Dry, intact, No rashes or lesions  MUSCULOSKELETAL: Full range of motion, normal strength  NEURO: sensation intact, extraocular movements intact, no tremor, normal reflexes  PSYCH: Alert and oriented x 3, normal affect, normal mood  CUSHING'S SIGNS: no striae    POCT Blood Glucose.: 277 mg/dL (11-02-22 @ 08:53)  POCT Blood Glucose.: 381 mg/dL (11-01-22 @ 22:18)  POCT Blood Glucose.: 306 mg/dL (11-01-22 @ 18:02)  POCT Blood Glucose.: 355 mg/dL (11-01-22 @ 12:54)  POCT Blood Glucose.: 281 mg/dL (11-01-22 @ 09:02)  POCT Blood Glucose.: 258 mg/dL (11-01-22 @ 01:17)  POCT Blood Glucose.: 413 mg/dL (10-31-22 @ 21:34)  POCT Blood Glucose.: 245 mg/dL (10-31-22 @ 18:11)  POCT Blood Glucose.: 241 mg/dL (10-31-22 @ 13:04)  POCT Blood Glucose.: 267 mg/dL (10-31-22 @ 09:51)                            14.4   12.19 )-----------( 199      ( 02 Nov 2022 06:44 )             42.4       11-02    134<L>  |  99  |  22  ----------------------------<  276<H>  4.4   |  21<L>  |  0.49<L>    eGFR: 105    Ca    8.9      11-02  Mg     2.1     10-30    TPro  7.4  /  Alb  4.0  /  TBili  0.5  /  DBili  x   /  AST  17  /  ALT  16  /  AlkPhos  118  11-01      Thyroid Function Tests:      A1C with Estimated Average Glucose Result: 11.0 % (10-31-22 @ 11:38)          Radiology:   ------------------------               HPI:  67F with pmhx DM, HTN and HLD presents with hypertension and tested positive for COVID w headache, cp, sob, chills and muscle aches. Pt states she took her bp at home several times and it was high (up to 185/105 at home). She reports that she also tested herself at home for covid today and was has been feeling sick for the past few days. Ot note, patient adds that yesterday the cp and sob got worse at night. Per son pt took tylenol yesterday and has been c/o for the past few days of mild blurry vision b/l. Per son, patient has not seen a PMD in over three years, but was told in the past that she has HTN and DM and was told her to take meds but she did not want to.  Patient denies fevers, numbness, tingling, N/V/D. Patient states that she was vaccinated and boosted against COVID, and does not have a cardiologist. Patient lives at home with  and son. Son adds that patient is normally independent but is not very active at home.    In the ED, /99, otherwise unremarkable. Labs remarkable for K 3.4, glucose 240, alk phos 134, COVID +. Imaging included CT head non con revealed indeterminate age lacunar infarct involving left ganglio-capsular region. Chest xray neg. Patient given 1 LR, amlodipine 2.5mg, Tylenol and decadron. Patient later hypoxic to 92% on RA and placed on 4LNC.    PCP: Marin Yip (31 Oct 2022 05:41)      ENDOCRINE HISTORY   Diagnosed with DM: About 5 years ago, diet controlled, didn't see any MD for 5 years  Last HbA1c: A1C with Estimated Average Glucose Result: 11.0 % (10-31-22 @ 11:38)  Endocrinologist: none  Home DM Meds: none  Adherence: none   Microvascular complications: none  Macrovascular complications: none  SMBG: Does not check glucose   Hypoglycemia episodes: None  Diet at home: Very liberal diet and heavy in Carbs.   Appetite in hospital: Good    History of hypertension, not on any BP meds at home.   Does not know if she had a history of high cholesterol.      PAST MEDICAL & SURGICAL HISTORY:  No pertinent past medical history      No significant past surgical history    FAMILY HISTORY:  Hypertension in multiple family members including parents, siblings.     Social History:  Never smoker  No alcohol  No Drugs    Home Medications:  None      MEDICATIONS  (STANDING):  amLODIPine   Tablet 7.5 milliGRAM(s) Oral daily  dexAMETHasone     Tablet 6 milliGRAM(s) Oral daily  dextrose 5%. 1000 milliLiter(s) (50 mL/Hr) IV Continuous <Continuous>  dextrose 5%. 1000 milliLiter(s) (100 mL/Hr) IV Continuous <Continuous>  dextrose 50% Injectable 25 Gram(s) IV Push once  dextrose 50% Injectable 12.5 Gram(s) IV Push once  dextrose 50% Injectable 25 Gram(s) IV Push once  enoxaparin Injectable 40 milliGRAM(s) SubCutaneous every 24 hours  glucagon  Injectable 1 milliGRAM(s) IntraMuscular once  insulin glargine Injectable (LANTUS) 14 Unit(s) SubCutaneous at bedtime  insulin lispro (ADMELOG) corrective regimen sliding scale   SubCutaneous three times a day before meals  insulin lispro (ADMELOG) corrective regimen sliding scale   SubCutaneous at bedtime  insulin lispro Injectable (ADMELOG) 6 Unit(s) SubCutaneous three times a day before meals  remdesivir  IVPB   IV Intermittent   remdesivir  IVPB 100 milliGRAM(s) IV Intermittent every 24 hours    MEDICATIONS  (PRN):  hydrALAZINE 10 milliGRAM(s) Oral daily PRN Systolic blood pressure >170  hydrALAZINE 10 milliGRAM(s) Oral once PRN Systolic blood pressure >160      Allergies    No Known Allergies    Review of Systems:  Constitutional: No fever  Eyes: No blurry vision  Neuro: No tremors  HEENT: No pain  Cardiovascular: No chest pain, palpitations  Respiratory: No SOB, no cough  GI: No nausea, vomiting, abdominal pain  : No dysuria  Skin: no rash  Psych: no depression  Endocrine: no polyuria, polydipsia  Hem/lymph: no swelling  Osteoporosis: no fractures    ALL OTHER SYSTEMS REVIEWED AND NEGATIVE    PHYSICAL EXAM:  -----------------------------  VITALS: T(C): 36.8 (11-02-22 @ 08:50)  T(F): 98.3 (11-02-22 @ 08:50), Max: 98.9 (11-01-22 @ 22:20)  HR: 81 (11-02-22 @ 08:50) (74 - 90)  BP: 152/79 (11-02-22 @ 08:50) (152/79 - 165/94)  RR:  (18 - 18)  SpO2:  (93% - 96%)  Wt(kg): --  GENERAL: NAD, well-groomed, well-developed  EYES: No proptosis, no lid lag, anicteric  HEENT:  Atraumatic, Normocephalic, moist mucous membranes  THYROID: Normal size, no palpable nodules  RESPIRATORY: Clear to auscultation bilaterally; No rales, rhonchi, wheezing, or rubs  CARDIOVASCULAR: Regular rate and rhythm; No murmurs; no peripheral edema  GI: Soft, nontender, non distended, normal bowel sounds  SKIN: Dry, intact, No rashes or lesions  MUSCULOSKELETAL: Full range of motion, normal strength  NEURO: sensation intact, extraocular movements intact, no tremor, normal reflexes  PSYCH: Alert and oriented x 3, normal affect, normal mood  CUSHING'S SIGNS: no striae    POCT Blood Glucose.: 277 mg/dL (11-02-22 @ 08:53)  POCT Blood Glucose.: 381 mg/dL (11-01-22 @ 22:18)  POCT Blood Glucose.: 306 mg/dL (11-01-22 @ 18:02)  POCT Blood Glucose.: 355 mg/dL (11-01-22 @ 12:54)  POCT Blood Glucose.: 281 mg/dL (11-01-22 @ 09:02)  POCT Blood Glucose.: 258 mg/dL (11-01-22 @ 01:17)  POCT Blood Glucose.: 413 mg/dL (10-31-22 @ 21:34)  POCT Blood Glucose.: 245 mg/dL (10-31-22 @ 18:11)  POCT Blood Glucose.: 241 mg/dL (10-31-22 @ 13:04)  POCT Blood Glucose.: 267 mg/dL (10-31-22 @ 09:51)                            14.4   12.19 )-----------( 199      ( 02 Nov 2022 06:44 )             42.4       11-02    134<L>  |  99  |  22  ----------------------------<  276<H>  4.4   |  21<L>  |  0.49<L>    eGFR: 105    Ca    8.9      11-02  Mg     2.1     10-30    TPro  7.4  /  Alb  4.0  /  TBili  0.5  /  DBili  x   /  AST  17  /  ALT  16  /  AlkPhos  118  11-01      Thyroid Function Tests:      A1C with Estimated Average Glucose Result: 11.0 % (10-31-22 @ 11:38)          Radiology:   ------------------------

## 2022-11-02 NOTE — DIETITIAN INITIAL EVALUATION ADULT - NSFNSPHYEXAMSKINFT_GEN_A_CORE
Pt states UBW ~116-120 pounds  % IBW ( pounds)  Skin: no noted pressure injuries as per flowsheets

## 2022-11-02 NOTE — DIETITIAN INITIAL EVALUATION ADULT - PERTINENT LABORATORY DATA
11-02    134<L>  |  99  |  22  ----------------------------<  276<H>  4.4   |  21<L>  |  0.49<L>    Ca    8.9      02 Nov 2022 06:44    TPro  7.4  /  Alb  4.0  /  TBili  0.5  /  DBili  x   /  AST  17  /  ALT  16  /  AlkPhos  118  11-01 11-02 @ 06:44: Na 134<L>, BUN 22, Cr 0.49<L>, <H>, K+ 4.4, Phos --, Mg --, Alk Phos --, ALT/SGPT --, AST/SGOT --, HbA1c --    POCT Blood Glucose.: 243 mg/dL (11-02-22 @ 12:47)  A1C with Estimated Average Glucose Result: 11.0 % (10-31-22 @ 11:38)

## 2022-11-02 NOTE — DIETITIAN INITIAL EVALUATION ADULT - PERSON TAUGHT/METHOD
- Provided education on Carbohydrate Consistent diet including sources of carbohydrates, portion sizes, pairing protein with carbohydrates, limiting sugar sweetened beverages in diet and the importance of consistent eating pattern to help optimize glycemic control.   - Discussed DASH diet education. Reviewed foods high in Na and cholesterol to avoid. Reviewed ways to decrease Na in your diet, discussed meal and snack options, tips for eating out; provided written Heart Healthy Eating Nutrition Therapy handout to Pt.   - Encouraged adequate PO intake for meeting nutritional needs, improving nutritional status and for preventing wt loss/verbal instruction/written material/teach back - (Patient repeats in own words)/patient instructed/son instructed

## 2022-11-02 NOTE — DIETITIAN INITIAL EVALUATION ADULT - NS FNS DIET ORDER
Diet, Consistent Carbohydrate/No Snacks:   DASH/TLC {Sodium & Cholesterol Restricted} (DASH) (10-31-22)

## 2022-11-02 NOTE — PROVIDER CONTACT NOTE (OTHER) - ACTION/TREATMENT ORDERED:
As per provider, continue with scheduled daily decadron, sliding scale modified. Will continue checking blood sugars before meals and at bedtime.
As per KALYANI Villegas, continue monitoring pt. Check FS @2AM
Provider notified and came to the bedside to discuss things with pt. No interventions recommended. Will cont to monitor. Pt is currently on a CPOx.
no

## 2022-11-02 NOTE — DIETITIAN INITIAL EVALUATION ADULT - ADD RECOMMEND
- Food preferences obtained and updated. Menu provided. RD to honor as able   - Nutrition care plan to remain consistent with pt goals of care  - Nutrition education provided to pt with written materials; pt aware RD remains available to review education/ diet as needed.   - RD remains available to review diet education and adjust diet recommendations as needed.

## 2022-11-02 NOTE — PROVIDER CONTACT NOTE (OTHER) - ASSESSMENT
pt blood sugar increasing daily, most recent bedtime POCT 381. Pt taking decadron PO once a day.
AxOx4. Cantonese speaking. Son at bedside and translates. Denies CP, SOB, and palpitations.
 pt asymptomatic, BP elevated., resting comfortably in the bed.

## 2022-11-02 NOTE — DIETITIAN INITIAL EVALUATION ADULT - LAB (SPECIFY)
- noted <H> and A1C with Estimated Average Glucose Result: 11.0 % (10-31-22 @ 11:38) - Team to provide/adjust insulin as needed to help maintain BG WNL   - Trend BG levels, renal indices, LFT's, electrolytes and triglycerides

## 2022-11-03 ENCOUNTER — TRANSCRIPTION ENCOUNTER (OUTPATIENT)
Age: 67
End: 2022-11-03

## 2022-11-03 LAB
GLUCOSE BLDC GLUCOMTR-MCNC: 255 MG/DL — HIGH (ref 70–99)
GLUCOSE BLDC GLUCOMTR-MCNC: 257 MG/DL — HIGH (ref 70–99)
GLUCOSE BLDC GLUCOMTR-MCNC: 263 MG/DL — HIGH (ref 70–99)
GLUCOSE BLDC GLUCOMTR-MCNC: 267 MG/DL — HIGH (ref 70–99)

## 2022-11-03 PROCEDURE — 99233 SBSQ HOSP IP/OBS HIGH 50: CPT

## 2022-11-03 PROCEDURE — 99232 SBSQ HOSP IP/OBS MODERATE 35: CPT

## 2022-11-03 RX ORDER — INSULIN LISPRO 100/ML
12 VIAL (ML) SUBCUTANEOUS
Refills: 0 | Status: DISCONTINUED | OUTPATIENT
Start: 2022-11-03 | End: 2022-11-04

## 2022-11-03 RX ORDER — INSULIN GLARGINE 100 [IU]/ML
16 INJECTION, SOLUTION SUBCUTANEOUS AT BEDTIME
Refills: 0 | Status: DISCONTINUED | OUTPATIENT
Start: 2022-11-03 | End: 2022-11-04

## 2022-11-03 RX ORDER — ISOPROPYL ALCOHOL, BENZOCAINE .7; .06 ML/ML; ML/ML
1 SWAB TOPICAL
Qty: 100 | Refills: 1
Start: 2022-11-03 | End: 2022-12-22

## 2022-11-03 RX ADMIN — INSULIN GLARGINE 16 UNIT(S): 100 INJECTION, SOLUTION SUBCUTANEOUS at 21:15

## 2022-11-03 RX ADMIN — Medication 9 UNIT(S): at 08:50

## 2022-11-03 RX ADMIN — Medication 12 UNIT(S): at 17:36

## 2022-11-03 RX ADMIN — Medication 2: at 21:15

## 2022-11-03 RX ADMIN — Medication 6: at 08:50

## 2022-11-03 RX ADMIN — ENOXAPARIN SODIUM 40 MILLIGRAM(S): 100 INJECTION SUBCUTANEOUS at 05:30

## 2022-11-03 RX ADMIN — REMDESIVIR 500 MILLIGRAM(S): 5 INJECTION INTRAVENOUS at 17:37

## 2022-11-03 RX ADMIN — AMLODIPINE BESYLATE 7.5 MILLIGRAM(S): 2.5 TABLET ORAL at 05:30

## 2022-11-03 RX ADMIN — Medication 6: at 12:31

## 2022-11-03 RX ADMIN — Medication 9 UNIT(S): at 12:32

## 2022-11-03 RX ADMIN — Medication 6: at 17:36

## 2022-11-03 RX ADMIN — Medication 6 MILLIGRAM(S): at 05:30

## 2022-11-03 RX ADMIN — CHLORHEXIDINE GLUCONATE 1 APPLICATION(S): 213 SOLUTION TOPICAL at 12:34

## 2022-11-03 NOTE — DISCHARGE NOTE PROVIDER - NSDCCPCAREPLAN_GEN_ALL_CORE_FT
PRINCIPAL DISCHARGE DIAGNOSIS  Diagnosis: Acute hypoxemic respiratory failure due to COVID-19  Assessment and Plan of Treatment: Coronavirus disease 2019 (COVID-19) is a respiratory illness  that can spread from person to person. The virus that causes  COVID-19 is a novel coronavirus that was first identified during  an investigation into an outbreak in [place], [place].  The virus that causes COVID-19 probably emerged from an  animal source, but is now spreading from person to person.  The virus is thought to spread mainly between people who  are in close contact with one another (within about 6 feet)  through respiratory droplets produced when an infected  person coughs or sneezes. It also may be possible that a person  can get COVID-19 by touching a surface or object that has  the virus on it and then touching their own mouth, nose, or  possibly their eyes, but this is not thought to be the main  way the virus spreads.  Please stay home and avoid contact with others for at least a week after symptoms resolve and follow government guidelines.   Patients with COVID-19 have had mild to severe respiratory  illness with symptoms of  • fever  • cough  • shortness of breath  People can help protect themselves from respiratory illness with  everyday preventive actions.    • Avoid close contact with people who are sick.  • Avoid touching your eyes, nose, and mouth with  unwashed hands.  • Wash your hands often with soap and water for at least 20   seconds. Use an alcohol-based hand  that contains at  least 60% alcohol if soap and water are not available.   Stay home when you are sick.  • Cover your cough or sneeze with a tissue, then throw the  tissue in the trash.  • Clean and disinfect frequently touched objects  and surfaces.  Call 911 and inform them you are covid positive before you decide to go to the emergency room if you have chest pain, difficulty breathing, high fevers, worsening of your symptoms, feel unwell, or have nausea and vomiting.      SECONDARY DISCHARGE DIAGNOSES  Diagnosis: Hypertensive emergency  Assessment and Plan of Treatment: Hypertension  Hypertension, commonly called high blood pressure, is when the force of blood pumping through your arteries is too strong. Hypertension forces your heart to work harder to pump blood. Your arteries may become narrow or stiff. Having untreated or uncontrolled hypertension for a long period of time can cause heart attack, stroke, kidney disease, and other problems. If started on a medication, take exactly as prescribed by your health care professional. Maintain a healthy lifestyle and follow up with your primary care physician.  CT head no acute ICH or acute pathology, but chronic microvascular ischemic changes and indeterminate age infarct.   c/o HA and mild blurry vision with no findings on exam  - Please continue with Amlodipine 7.5 mg oral daily   - YOU WILL NEED TO BE STARTED ON ARB as oupt  and will need tighter BP control at home. Please follow up with your PCP within one week of hospital discharge.   SEEK IMMEDIATE MEDICAL CARE IF YOU HAVE ANY OF THE FOLLOWING SYMPTOMS: severe headache, confusion, chest pain, abdominal pain, vomiting, or shortness of breath.    Diagnosis: Diabetes mellitus  Assessment and Plan of Treatment: Diabetes is a chronic condition caused by high blood sugar levels. Your blood sugar levels become high because your body does not have enough insulin. Insulin helps move sugar out of the blood so it can be used for energy. Increased sugar in your blood can cause problems in several organs of your body including but not limited to your blood vessels, your kidneys, your brain, and your eyes. The lack of insulin forces your body to use fat instead of sugar for energy. This can be dangerous if not controlled.  Seek Medical Attention If:  You have a seizure.  You begin to breathe fast, or are short of breath.  You become weak and confused.  You are more drowsy than usual.  You have fruity, sweet breath.  You have severe, new stomach pain and are vomiting.  Your blood sugar level is lower or higher than your healthcare provider says it should be.  You have ketones in your blood or urine.  You have a fever or chills.  You are more thirsty than usual.  You are urinating more often than usual.  You have questions or concerns about your condition or care.  Insulin and diabetes medicine decreases the amount of sugar in your blood.  The best way to prevent problems from Diabetes is to control your blood sugars.   Monitor your blood sugar levels closely. Administer Insulin as directed by your physician.   Speak with your doctor and/or a nutritionist about the best way to change your lifestyle and dietary habits to avoid any problems from Diabetes in the future.     PRINCIPAL DISCHARGE DIAGNOSIS  Diagnosis: Acute hypoxemic respiratory failure due to COVID-19  Assessment and Plan of Treatment: You have tested POSITIVE for the novel coronavirus (COVID-19).   You required oxygen due to low oxygen level - now you oxygen level is normal off Oxygen supplementation.  - CXR neg  - Neg D-Dimers  - You were given - antiviral - Remdesivir 10/31 - 11/4  -  Dexamethasone for 10 days total   You no longer require hospitalization.  Follow up with your doctor within 1 week.  You have tested POSITIVE for the novel coronavirus (COVID-19).   You no longer require hospitalization.  Follow up with your doctor within1 week.  - Take Tylenol (Acetaminophen) 650mg as needed for pain or fever  .- Stay well hydrated.   - Isolate yourself, stay home for at least 5 days and until you are fever free for 24 hours and symptoms are improving followed by 5 days of wearing a mask when around others.  - Wash hands frequently in warm soapy water for at least 20 seconds.  - Protect those you live with by staying 6 feet away when you are in the same room, wearing a mask, washing hands frequently, coughing and sneezing into as elbow, or a tissue.  - Notify your household and close contacts to immediately quarantine.  - Avoid contact with anybody who is sick OR at risk populations including elderly, young, pregnant patients, immune compromised people.  SEEK IMMEDIATE MEDICAL CARE IF YOU HAVE ANY OF THE FOLLOWING SYMPTOMS:  for any new or worsening symptoms including but not limited to difficulty breathing, severe weakness, confusion, chest pain, fever over 10 days, or lightheadedness/dizziness.        SECONDARY DISCHARGE DIAGNOSES  Diagnosis: Hypertensive emergency  Assessment and Plan of Treatment: You reported headache and mild blurry vision with no findings on exam  - CT head no acute ICH or acute pathology, but chronic microvascular ischemic changes and indeterminate age infarct.   - Continue Amlodipine 7.5 mg oral daily   - WILL NEED TO BE STARTED ON ARB as oupt  and will need tighter BP control at home  Follow up with primary physician in 1 week  Hypertension  Hypertension, commonly called high blood pressure, is when the force of blood pumping through your arteries is too strong. Hypertension forces your heart to work harder to pump blood. Your arteries may become narrow or stiff. Having untreated or uncontrolled hypertension for a long period of time can cause heart attack, stroke, kidney disease, and other problems. If started on a medication, take exactly as prescribed by your health care professional. Maintain a healthy lifestyle and follow up with your primary care physician.  SEEK IMMEDIATE MEDICAL CARE IF YOU HAVE ANY OF THE FOLLOWING SYMPTOMS: severe headache, confusion, chest pain, abdominal pain, vomiting, or shortness of breath.    Diagnosis: Diabetes mellitus  Assessment and Plan of Treatment: You   - Not on any home meds and NOT controlled   - A1c= 11   - Insulin - Basal and Lispro per endocrine recs started in hospital  - Endo/Nutritionist and DM educator consults, endo rec is noted.  Diabetes is a chronic condition caused by high blood sugar levels. Your blood sugar levels become high because your body does not have enough insulin. Insulin helps move sugar out of the blood so it can be used for energy. Increased sugar in your blood can cause problems in several organs of your body including but not limited to your blood vessels, your kidneys, your brain, and your eyes. The lack of insulin forces your body to use fat instead of sugar for energy. This can be dangerous if not controlled.  Seek Medical Attention If:  You have a seizure.  You begin to breathe fast, or are short of breath.  You become weak and confused.  You are more drowsy than usual.  You have fruity, sweet breath.  You have severe, new stomach pain and are vomiting.  Your blood sugar level is lower or higher than your healthcare provider says it should be.  You have ketones in your blood or urine.  You have a fever or chills.  You are more thirsty than usual.  You are urinating more often than usual.  You have questions or concerns about your condition or care.  Insulin and diabetes medicine decreases the amount of sugar in your blood.  The best way to prevent problems from Diabetes is to control your blood sugars.   Monitor your blood sugar levels closely. Administer Insulin as directed by your physician.   Speak with your doctor and/or a nutritionist about the best way to change your lifestyle and dietary habits to avoid any problems from Diabetes in the future.    Diagnosis: Hyperlipidemia  Assessment and Plan of Treatment: Your blood work showed that you have high cholesterol levels  - Not on any home medication  - Started Statin   - Monitor liver enzymes as outpatient - Robinson sepulveda with PMD for blood work     PRINCIPAL DISCHARGE DIAGNOSIS  Diagnosis: Acute hypoxemic respiratory failure due to COVID-19  Assessment and Plan of Treatment: You have tested POSITIVE for the novel coronavirus (COVID-19).   You required oxygen due to low oxygen level - now you oxygen level is normal off Oxygen supplementation.  - CXR neg  - Neg D-Dimers  - You were given - antiviral - Remdesivir 10/31 - 11/4  -  Dexamethasone for 10 days total   You no longer require hospitalization.  Follow up with your doctor within 1 week.  You have tested POSITIVE for the novel coronavirus (COVID-19).   You no longer require hospitalization.  Follow up with your doctor within1 week.  - Take Tylenol (Acetaminophen) 650mg as needed for pain or fever  .- Stay well hydrated.   - Isolate yourself, stay home for at least 5 days and until you are fever free for 24 hours and symptoms are improving followed by 5 days of wearing a mask when around others.  - Wash hands frequently in warm soapy water for at least 20 seconds.  - Protect those you live with by staying 6 feet away when you are in the same room, wearing a mask, washing hands frequently, coughing and sneezing into as elbow, or a tissue.  - Notify your household and close contacts to immediately quarantine.  - Avoid contact with anybody who is sick OR at risk populations including elderly, young, pregnant patients, immune compromised people.  SEEK IMMEDIATE MEDICAL CARE IF YOU HAVE ANY OF THE FOLLOWING SYMPTOMS:  for any new or worsening symptoms including but not limited to difficulty breathing, severe weakness, confusion, chest pain, fever over 10 days, or lightheadedness/dizziness.        SECONDARY DISCHARGE DIAGNOSES  Diagnosis: Hypertensive emergency  Assessment and Plan of Treatment: You reported headache and mild blurry vision with no findings on exam  - CT head no acute ICH or acute pathology, but chronic microvascular ischemic changes and indeterminate age infarct.   - Continue Amlodipine 7.5 mg oral daily   - WILL NEED TO BE STARTED ON ARB as oupt  and will need tighter BP control at home  Follow up with primary physician in 1 week  Hypertension  Hypertension, commonly called high blood pressure, is when the force of blood pumping through your arteries is too strong. Hypertension forces your heart to work harder to pump blood. Your arteries may become narrow or stiff. Having untreated or uncontrolled hypertension for a long period of time can cause heart attack, stroke, kidney disease, and other problems. If started on a medication, take exactly as prescribed by your health care professional. Maintain a healthy lifestyle and follow up with your primary care physician.  SEEK IMMEDIATE MEDICAL CARE IF YOU HAVE ANY OF THE FOLLOWING SYMPTOMS: severe headache, confusion, chest pain, abdominal pain, vomiting, or shortness of breath.    Diagnosis: Diabetes mellitus  Assessment and Plan of Treatment: You were found to have elevated blood sugar  - Not on any home meds and NOT controlled   - HgA1C this admission = 11   - Insulin - Basal and Lispro per endocrine recs started in hospital.  Follow up with Endocrine in 1 week  Make sure you get your HgA1c checked every three months.  If you take insulin, check your blood glucose before meals and at bedtime.  It's important not to skip any meals.  Keep a log of your blood glucose results and always take it with you to your doctor appointments.  Keep a list of your current medications including injectables and over the counter medications and bring this medication list with you to all your doctor appointments.  If you have not seen your ophthalmologist this year call for appointment.  Check your feet daily for redness, sores, or openings. Do not self treat. If no improvement in two days call your primary care physician for an appointment.  Low blood sugar (hypoglycemia) is a blood sugar below 70mg/dl. Check your blood sugar if you feel signs/symptoms of hypoglycemia. If your blood sugar is below 70 take 15 grams of carbohydrates (ex 4 oz of apple juice, 3-4 glucose tablets, or 4-6 oz of regular soda) wait 15 minutes and repeat blood sugar to make sure it comes up above 70.  If your blood sugar is above 70 and you are due for a meal, have a meal.  If you are not due for a meal have a snack.  This snack helps keeps your blood sugar at a safe range.      Diagnosis: Hyperlipidemia  Assessment and Plan of Treatment: Your blood work showed that you have high cholesterol levels  - Not on any home medication  - Started Statin   - Monitor liver enzymes as outpatient - Robinson sepulveda with PMD for blood work     PRINCIPAL DISCHARGE DIAGNOSIS  Diagnosis: Acute hypoxemic respiratory failure due to COVID-19  Assessment and Plan of Treatment: You have tested POSITIVE for the novel coronavirus (COVID-19).   You required oxygen due to low oxygen level - now your oxygen level is normal off Oxygen supplementation.  - CXR neg  - Neg D-Dimers  - You were given - antiviral - Remdesivir 10/31 - 11/4  -  Dexamethasone for 10 days total   You no longer require hospitalization.  Follow up with your doctor within 1 week.  You have tested POSITIVE for the novel coronavirus (COVID-19).   You no longer require hospitalization.  Follow up with your doctor within1 week.  - Take Tylenol (Acetaminophen) 650mg as needed for pain or fever  .- Stay well hydrated.   - Isolate yourself, stay home for at least 5 days and until you are fever free for 24 hours and symptoms are improving followed by 5 days of wearing a mask when around others.  - Wash hands frequently in warm soapy water for at least 20 seconds.  - Protect those you live with by staying 6 feet away when you are in the same room, wearing a mask, washing hands frequently, coughing and sneezing into as elbow, or a tissue.  - Notify your household and close contacts to immediately quarantine.  - Avoid contact with anybody who is sick OR at risk populations including elderly, young, pregnant patients, immune compromised people.  SEEK IMMEDIATE MEDICAL CARE IF YOU HAVE ANY OF THE FOLLOWING SYMPTOMS:  for any new or worsening symptoms including but not limited to difficulty breathing, severe weakness, confusion, chest pain, fever over 10 days, or lightheadedness/dizziness.        SECONDARY DISCHARGE DIAGNOSES  Diagnosis: Hypertensive emergency  Assessment and Plan of Treatment: You reported headache and mild blurry vision with no findings on exam  - CT head no acute ICH or acute pathology, but chronic microvascular ischemic changes and indeterminate age infarct.   Your blood pressure was elevated  - Continue Amlodipine 7.5 mg oral daily   - Follow up with primary doctor in 1 week as oupt  and will need tighter BP control at home - you will require another calss of medication called ARB to be started.  Follow up with primary physician in 1 week  Hypertension  Hypertension, commonly called high blood pressure, is when the force of blood pumping through your arteries is too strong. Hypertension forces your heart to work harder to pump blood. Your arteries may become narrow or stiff. Having untreated or uncontrolled hypertension for a long period of time can cause heart attack, stroke, kidney disease, and other problems. If started on a medication, take exactly as prescribed by your health care professional. Maintain a healthy lifestyle and follow up with your primary care physician.  SEEK IMMEDIATE MEDICAL CARE IF YOU HAVE ANY OF THE FOLLOWING SYMPTOMS: severe headache, confusion, chest pain, abdominal pain, vomiting, or shortness of breath.    Diagnosis: Diabetes mellitus  Assessment and Plan of Treatment: You were found to have elevated blood sugar  - Not on any home meds and NOT controlled   - HgA1C this admission = 11   - Insulin - Long acting adn short acting insulin was started in hospital.  Discharge recommendation:  While ON DEXAMETHASONE (STEROID FOR COVID) for 5 more days  TAKE LANTUS 20 Units at night and short acting ADMELOG 3 x day(15 units before each meals)  When OFF dexamethasone:  TAKE LANTUS 12 units HS and discontinue Admelog  Start Metformin after completing Dexamethasone and titrate up to 1000mg bid  Metformin: Week 1- 500mg po bid, week 2: 500mg po qam/1000mg po pqm, and week 3: 1000mg po bid   Follow up with Endocrine/PMD in 1 week  Make sure you get your HgA1c checked every three months.  If you take insulin, check your blood glucose before meals and at bedtime.  It's important not to skip any meals.  Keep a log of your blood glucose results and always take it with you to your doctor appointments.  Keep a list of your current medications including injectables and over the counter medications and bring this medication list with you to all your doctor appointments.  Follow up with ophthalmologist and Poditrist annually   Check your feet daily for redness, sores, or openings. Do not self treat. If no improvement in two days call your primary care physician for an appointment.  Low blood sugar (hypoglycemia) is a blood sugar below 70mg/dl. Check your blood sugar if you feel signs/symptoms of hypoglycemia. If your blood sugar is below 70 take 15 grams of carbohydrates (ex 4 oz of apple juice, 3-4 glucose tablets, or 4-6 oz of regular soda) wait 15 minutes and repeat blood sugar to make sure it comes up above 70.  If your blood sugar is above 70 and you are due for a meal, have a meal.  If you are not due for a meal have a snack.  This snack helps keeps your blood sugar at a safe range.      Diagnosis: Hyperlipidemia  Assessment and Plan of Treatment: Your blood work showed that you have high cholesterol levels  - Not on any home medication  - Started Statin   - Monitor liver enzymes as outpatient - Robinson sepulveda with PMD for blood work     PRINCIPAL DISCHARGE DIAGNOSIS  Diagnosis: Acute hypoxemic respiratory failure due to COVID-19  Assessment and Plan of Treatment: You have tested POSITIVE for the novel coronavirus (COVID-19).   You required oxygen due to low oxygen level - now your oxygen level is normal off Oxygen supplementation.  - CXR neg  - Neg D-Dimers  - You were given - antiviral - Remdesivir 10/31 - 11/4  -  Dexamethasone for 10 days total   You no longer require hospitalization.  Follow up with your doctor within 1 week.  You have tested POSITIVE for the novel coronavirus (COVID-19).   You no longer require hospitalization.  Follow up with your doctor within1 week.  - Take Tylenol (Acetaminophen) 650mg as needed for pain or fever  .- Stay well hydrated.   - Isolate yourself, stay home for at least 5 days and until you are fever free for 24 hours and symptoms are improving followed by 5 days of wearing a mask when around others.  - Wash hands frequently in warm soapy water for at least 20 seconds.  - Protect those you live with by staying 6 feet away when you are in the same room, wearing a mask, washing hands frequently, coughing and sneezing into as elbow, or a tissue.  - Notify your household and close contacts to immediately quarantine.  - Avoid contact with anybody who is sick OR at risk populations including elderly, young, pregnant patients, immune compromised people.  SEEK IMMEDIATE MEDICAL CARE IF YOU HAVE ANY OF THE FOLLOWING SYMPTOMS:  for any new or worsening symptoms including but not limited to difficulty breathing, severe weakness, confusion, chest pain, fever over 10 days, or lightheadedness/dizziness.        SECONDARY DISCHARGE DIAGNOSES  Diagnosis: Hypertensive emergency  Assessment and Plan of Treatment: You reported headache and mild blurry vision with no findings on exam  - CT head no acute ICH or acute pathology, but chronic microvascular ischemic changes and indeterminate age infarct.   Your blood pressure was elevated  - Continue Amlodipine 7.5 mg oral daily   - Follow up with primary doctor in 1 week as oupt  and will need tighter BP control at home - you will require another calss of medication called ARB to be started.  Follow up with primary physician in 1 week  Hypertension  Hypertension, commonly called high blood pressure, is when the force of blood pumping through your arteries is too strong. Hypertension forces your heart to work harder to pump blood. Your arteries may become narrow or stiff. Having untreated or uncontrolled hypertension for a long period of time can cause heart attack, stroke, kidney disease, and other problems. If started on a medication, take exactly as prescribed by your health care professional. Maintain a healthy lifestyle and follow up with your primary care physician.  SEEK IMMEDIATE MEDICAL CARE IF YOU HAVE ANY OF THE FOLLOWING SYMPTOMS: severe headache, confusion, chest pain, abdominal pain, vomiting, or shortness of breath.    Diagnosis: Diabetes mellitus  Assessment and Plan of Treatment: You were found to have elevated blood sugar  - Not on any home meds and NOT controlled   - HgA1C this admission = 11   - Insulin - Long acting adn short acting insulin was started in hospital.  Discharge recommendation:  While ON DEXAMETHASONE (STEROID FOR COVID) for 5 more days  TAKE LANTUS 20 Units at night and short acting ADMELOG 3 x day(15 units before each meals)  When OFF dexamethasone:  TAKE LANTUS 12 units HS and discontinue Admelog  Start Metformin after completing Dexamethasone and titrate up to 1000mg bid  Metformin: Week 1- 500mg po bid, week 2: 500mg po qam/1000mg po pqm, and week 3: 1000mg po bid   Follow up with Endocrine/PMD in 1 week  Make sure you get your HgA1c checked every three months.  If you take insulin, check your blood glucose before meals and at bedtime.  It's important not to skip any meals.  Keep a log of your blood glucose results and always take it with you to your doctor appointments.  Keep a list of your current medications including injectables and over the counter medications and bring this medication list with you to all your doctor appointments.  Follow up with ophthalmologist and Poditrist annually   Check your feet daily for redness, sores, or openings. Do not self treat. If no improvement in two days call your primary care physician for an appointment.  Low blood sugar (hypoglycemia) is a blood sugar below 70mg/dl. Check your blood sugar if you feel signs/symptoms of hypoglycemia. If your blood sugar is below 70 take 15 grams of carbohydrates (ex 4 oz of apple juice, 3-4 glucose tablets, or 4-6 oz of regular soda) wait 15 minutes and repeat blood sugar to make sure it comes up above 70.  If your blood sugar is above 70 and you are due for a meal, have a meal.  If you are not due for a meal have a snack.  This snack helps keeps your blood sugar at a safe range.      Diagnosis: Hyperlipidemia  Assessment and Plan of Treatment: Your blood work showed that you have high cholesterol levels  - Not on any home medication  - Statin to be started as outpatient.   Eat a heart healthy diet that is low in saturated fats and salt, and includes whole grains, fruits, vegetables and lean protein; exercise regularly (consult with your physician or cardiologist first); maintain a heart healthy weight;  Continue to follow with your primary physician or cardiologist.  Seek medical help for dizziness, Lightheadedness, Blurry vision, Headache, Chest pain, Shortness of breath       PRINCIPAL DISCHARGE DIAGNOSIS  Diagnosis: Acute hypoxemic respiratory failure due to COVID-19  Assessment and Plan of Treatment: You have tested POSITIVE for the novel coronavirus (COVID-19).   You required oxygen due to low oxygen level - now your oxygen level is normal off Oxygen supplementation.  - CXR neg  - Neg D-Dimers  - You were given - antiviral - Remdesivir 10/31 - 11/4  -  Dexamethasone for 10 days total   You no longer require hospitalization.  Follow up with your doctor within 1 week.  You have tested POSITIVE for the novel coronavirus (COVID-19).   You no longer require hospitalization.  Follow up with your doctor within1 week.  - Take Tylenol (Acetaminophen) 650mg as needed for pain or fever  .- Stay well hydrated.   - Isolate yourself, stay home for at least 5 days and until you are fever free for 24 hours and symptoms are improving followed by 5 days of wearing a mask when around others.  - Wash hands frequently in warm soapy water for at least 20 seconds.  - Protect those you live with by staying 6 feet away when you are in the same room, wearing a mask, washing hands frequently, coughing and sneezing into as elbow, or a tissue.  - Notify your household and close contacts to immediately quarantine.  - Avoid contact with anybody who is sick OR at risk populations including elderly, young, pregnant patients, immune compromised people.  SEEK IMMEDIATE MEDICAL CARE IF YOU HAVE ANY OF THE FOLLOWING SYMPTOMS:  for any new or worsening symptoms including but not limited to difficulty breathing, severe weakness, confusion, chest pain, fever over 10 days, or lightheadedness/dizziness.        SECONDARY DISCHARGE DIAGNOSES  Diagnosis: Hypertensive emergency  Assessment and Plan of Treatment: You reported headache and mild blurry vision with no findings on exam  - CT head no acute ICH or acute pathology, but chronic microvascular ischemic changes and indeterminate age infarct.   Your blood pressure was elevated  - Continue Amlodipine 7.5 mg oral daily   - Follow up with primary doctor in 1 week as oupt  and will need tighter BP control at home - you will require another calss of medication called ARB to be started.  Follow up with primary physician in 1 week  Hypertension  Hypertension, commonly called high blood pressure, is when the force of blood pumping through your arteries is too strong. Hypertension forces your heart to work harder to pump blood. Your arteries may become narrow or stiff. Having untreated or uncontrolled hypertension for a long period of time can cause heart attack, stroke, kidney disease, and other problems. If started on a medication, take exactly as prescribed by your health care professional. Maintain a healthy lifestyle and follow up with your primary care physician.  SEEK IMMEDIATE MEDICAL CARE IF YOU HAVE ANY OF THE FOLLOWING SYMPTOMS: severe headache, confusion, chest pain, abdominal pain, vomiting, or shortness of breath.    Diagnosis: Diabetes mellitus  Assessment and Plan of Treatment: You were found to have elevated blood sugar  - Not on any home meds and NOT controlled   - HgA1C this admission = 11   - goal of a1c <7% to prevent microvascular complications of diabetes mellitus and reduce complications - such as heart artery clogging, renal failure, blindness, nerve pain  - Insulin - Long acting and short acting insulin was started in hospital.  Discharge recommendation:  While ON DEXAMETHASONE (STEROID FOR COVID) for 5 more days  TAKE LANTUS 20 Units at night and short acting ADMELOG 3 x day(15 units before each meals)  When OFF dexamethasone:  TAKE LANTUS 12 units HS and discontinue Admelog  Start Metformin after completing Dexamethasone and titrate up to 1000mg bid  Metformin: Week 1- 500mg po bid, week 2: 500mg po qam/1000mg po pqm, and week 3: 1000mg po bid   Follow up with Endocrine/PMD in 1 week  Make sure you get your HgA1c checked every three months.  If you take insulin, check your blood glucose before meals and at bedtime.  It's important not to skip any meals.  Keep a log of your blood glucose results and always take it with you to your doctor appointments.  Keep a list of your current medications including injectables and over the counter medications and bring this medication list with you to all your doctor appointments.  Follow up with ophthalmologist and Poditrist annually   Check your feet daily for redness, sores, or openings. Do not self treat. If no improvement in two days call your primary care physician for an appointment.  Low blood sugar (hypoglycemia) is a blood sugar below 70mg/dl. Check your blood sugar if you feel signs/symptoms of hypoglycemia. If your blood sugar is below 70 take 15 grams of carbohydrates (ex 4 oz of apple juice, 3-4 glucose tablets, or 4-6 oz of regular soda) wait 15 minutes and repeat blood sugar to make sure it comes up above 70.  If your blood sugar is above 70 and you are due for a meal, have a meal.  If you are not due for a meal have a snack.  This snack helps keeps your blood sugar at a safe range.      Diagnosis: Hyperlipidemia  Assessment and Plan of Treatment: Your blood work showed that you have high cholesterol levels  - Not on any home medication  - Statin to be started as outpatient.   Eat a heart healthy diet that is low in saturated fats and salt, and includes whole grains, fruits, vegetables and lean protein; exercise regularly (consult with your physician or cardiologist first); maintain a heart healthy weight;  Continue to follow with your primary physician or cardiologist.  Seek medical help for dizziness, Lightheadedness, Blurry vision, Headache, Chest pain, Shortness of breath

## 2022-11-03 NOTE — DISCHARGE NOTE PROVIDER - NSDCMRMEDTOKEN_GEN_ALL_CORE_FT
alcohol swabs : Apply topically to affected area 4 times a day   glucometer (per patient&#x27;s insurance): Test blood sugars four times a day. Dispense #1 glucometer.  Insulin Pen Needles, 4mm: 1 application subcutaneously 4 times a day. ** Use with insulin pen **   test strips (per patient&#x27;s insurance): 1 application subcutaneously 4 times a day. ** Compatible with patient&#x27;s glucometer **   alcohol swabs : Apply topically to affected area 4 times a day   glucometer (per patient&#x27;s insurance): Test blood sugars four times a day. Dispense #1 glucometer.  Insulin Pen Needles, 4mm: 1 application subcutaneously 4 times a day. ** Use with insulin pen **   lancets: 1 application subcutaneously 4 times a day   test strips (per patient&#x27;s insurance): 1 application subcutaneously 4 times a day. ** Compatible with patient&#x27;s glucometer **   alcohol swabs : Apply topically to affected area 4 times a day   glucometer (per patient&#x27;s insurance): Test blood sugars four times a day. Dispense #1 glucometer.  Insulin Pen Needles, 4mm: 1 application subcutaneously 4 times a day. ** Use with insulin pen **   lancets: 1 application subcutaneously 4 times a day   Lantus Solostar Pen 100 units/mL subcutaneous solution: 16 unit(s) subcutaneous once a day   Disp: 1 Box  test strips (per patient&#x27;s insurance): 1 application subcutaneously 4 times a day. ** Compatible with patient&#x27;s glucometer **   alcohol swabs : Apply topically to affected area 4 times a day   amLODIPine 2.5 mg oral tablet: 3 tab(s) orally once a day  dexamethasone 6 mg oral tablet: 1 tab(s) orally once a day x 5 more days  glucometer (per patient&#x27;s insurance): Test blood sugars four times a day. Dispense #1 glucometer.  Insulin Pen Needles, 4mm: 1 application subcutaneously 4 times a day. ** Use with insulin pen **   lancets: 1 application subcutaneously 4 times a day   Lantus Solostar Pen 100 units/mL subcutaneous solution: 16 unit(s) subcutaneous once a day   Disp: 1 Box  metFORMIN 500 mg oral tablet: 1 tab(s) orally 2 times a day   Metformin: FIRST WEEK - 500mg orally 2 x day:  SECOND WEEK  500mg orally in the morning [id number] (2 tabs) orally with DINNER.  THEN FROM THIRD WEEK 1000mg (2 tabs)po 2 x day  test strips (per patient&#x27;s insurance): 1 application subcutaneously 4 times a day. ** Compatible with patient&#x27;s glucometer **   Admelog SoloStar 100 units/mL injectable solution: 12 unit(s) subcutaneous 3 times a day (with meals)   Disp 2  Admelog SoloStar 100 units/mL injectable solution: 15 unit(s) subcutaneous 3 times a day (with meals)  UNTIL COMPLETION OF DEXAMETHASONE x 5 days on 11/9/22. Then discontinue  Disp 2   alcohol swabs : Apply topically to affected area 4 times a day   amLODIPine 2.5 mg oral tablet: 3 tab(s) orally once a day  dexamethasone 6 mg oral tablet: 1 tab(s) orally once a day x 5 more days  glucometer (per patient&#x27;s insurance): Test blood sugars four times a day. Dispense #1 glucometer.  Insulin Pen Needles, 4mm: 1 application subcutaneously 4 times a day. ** Use with insulin pen **   lancets: 1 application subcutaneously 4 times a day   Lantus Solostar Pen 100 units/mL subcutaneous solution: 20 unit(s) subcutaneous once a day  UNTIL COMPLETION OF DEXAMETHSONE on 11/9  THEN TAKE 12 UNITS at BEDTIME starting 11/10/22    Disp: 1 Box   metFORMIN 500 mg oral tablet: 1 tab(s) orally 2 times a day   Metformin: FIRST WEEK - 500mg orally 2 x day:  SECOND WEEK  500mg orally in the morning [id number] (2 tabs) orally with DINNER.  THEN FROM THIRD WEEK 1000mg (2 tabs)po 2 x day    FROM 11/10/22 AFTER COMPLETING DEXAMETHASONE COURSE  test strips (per patient&#x27;s insurance): 1 application subcutaneously 4 times a day. ** Compatible with patient&#x27;s glucometer **   Admelog SoloStar 100 units/mL injectable solution: 15 unit(s) subcutaneous 3 times a day (with meals)  UNTIL COMPLETION OF DEXAMETHASONE x 5 days on 11/9/22. Then discontinue  Disp 2   alcohol swabs : Apply topically to affected area 4 times a day   amLODIPine 2.5 mg oral tablet: 3 tab(s) orally once a day  Aspirin Enteric Coated 81 mg oral delayed release tablet: 1 tab(s) orally once a day   dexamethasone 6 mg oral tablet: 1 tab(s) orally once a day x 5 more days  glucometer (per patient&#x27;s insurance): Test blood sugars four times a day. Dispense #1 glucometer.  Insulin Pen Needles, 4mm: 1 application subcutaneously 4 times a day. ** Use with insulin pen **   lancets: 1 application subcutaneously 4 times a day   Lantus Solostar Pen 100 units/mL subcutaneous solution: 20 unit(s) subcutaneous once a day  UNTIL COMPLETION OF DEXAMETHSONE on 11/9  THEN TAKE 12 UNITS at BEDTIME starting 11/10/22    Disp: 1 Box   metFORMIN 500 mg oral tablet: 1 tab(s) orally 2 times a day   Metformin: FIRST WEEK - 500mg orally 2 x day:  SECOND WEEK  500mg orally in the morning [id number] (2 tabs) orally with DINNER.  THEN FROM THIRD WEEK 1000mg (2 tabs)po 2 x day    FROM 11/10/22 AFTER COMPLETING DEXAMETHASONE COURSE  test strips (per patient&#x27;s insurance): 1 application subcutaneously 4 times a day. ** Compatible with patient&#x27;s glucometer **

## 2022-11-03 NOTE — DISCHARGE NOTE PROVIDER - DISCHARGE DIET
June 12, 2020     Patient: Johnny Lim   YOB: 1954   Date of Visit: 6/12/2020       To Whom it May Concern:    Johnny Lim was seen in my clinic.    Please excuse Johnny for his absence from work from 06/09/2020 until 06/19/2020 may return to work on 06/22/2020.  Sincerely,         Becky Garcias MD    Medical information is confidential and cannot be disclosed without the written consent of the patient or his representative.       Consistent Carbohydrate Diabetic Diets

## 2022-11-03 NOTE — DISCHARGE NOTE PROVIDER - NSFOLLOWUPCLINICS_GEN_ALL_ED_FT
[facility]  Endocrinology  [address] [place] [address]  Phone: [phone]  Fax:   Follow Up Time: 2 weeks

## 2022-11-03 NOTE — PROGRESS NOTE ADULT - PROBLEM SELECTOR PLAN 1
CT head no acute ICH or acute pathology, but chronic microvascular ischemic changes and indeterminate age infarct.   c/o HA and mild blurry vision with no findings on exam  - S/P oral amlodipine 2.5mg  - amlodipine 5mg qd--> CW Norvasc 7.5 mg oral daily and monitor   - hydralazine 10mg q8 prn SBP>170 CT head no acute ICH or acute pathology, but chronic microvascular ischemic changes and indeterminate age infarct.   c/o HA and mild blurry vision with no findings on exam  - S/P oral amlodipine 2.5mg  - amlodipine 5mg qd--> CW Norvasc 7.5 mg oral daily and monitor   - WILL NEED TO BE STARTED ON ARB as oupt  and will need tighter BP control at home   PCP f/up within one week of hosp DC

## 2022-11-03 NOTE — DISCHARGE NOTE PROVIDER - CARE PROVIDER_API CALL
[facility],   [address] [place] [id number]  Phone: [phone]  Fax: (   )    -  Follow Up Time: 1 week    primary physian,   Phone: (   )    -  Fax: (   )    -  Follow Up Time: 1 week

## 2022-11-03 NOTE — DISCHARGE NOTE PROVIDER - NSDCFUADDAPPT_GEN_ALL_CORE_FT
APPTS ARE READY TO BE MADE: [x] YES    Best Family or Patient Contact (if needed):    Additional Information about above appointments (if needed):    1:   2:   3:     Other comments or requests:    APPTS ARE READY TO BE MADE: [x] YES    Best Family or Patient Contact (if needed):    Additional Information about above appointments (if needed):    1:   2:   3:     Other comments or requests:   Patient was provided with follow up request details and was advised to call to schedule follow up within specified time frame.

## 2022-11-03 NOTE — PROGRESS NOTE ADULT - PROBLEM SELECTOR PLAN 2
[facility]   Cardiology Note              Date:  May 1, 2019  Patientname: Eve Leon  YOB: 1962    Primary Care physician: GERMAN Murphy CNP    HISTORY OF PRESENT ILLNESS: Eve Leon is a 49 y.o. female with a history of CAD/STEMI with multiple PCIs, HTN, COPD, DM. She had an angiogram in 2013 and 2016 that showed moderate CAD, medical management. In 5/2018, she had a posterior MI and had MARCOS to mLCx and POBA mLAD, IABP placement. Decision for CABG and underwent CABG x3 6/13/18. She was recommended for angiogram 2/2019 due to increasing angina. Findings include patent grafts but atretic LIMA and dLAD disease, treated with MARCOS. Continued to have angina. She presented as an outpatient 4/9/2019 for staged PCI in the setting of angina refractory to medical therapy. Underwent PCI LAD with orbital atherectomy, MARCOS x2, and POBA pre existing distal stent. No post procedure complications, discharged 4/10/19. Today she presents for hospital follow up for CAD post PCI. She feels a lot better. She has chronic angina that she experiences but it is improved and not as frequent. She feels she has more energy. No shortness of breath, dizziness or syncope. She has occasional palpitations that are unchanged and not bothersome. No bleeding issues. She has been doing yard work and tolerating well. She has some groin site tenderness but it is improving. BP at home usually 120s-130s/80s.     Past Medical History:   has a past medical history of Acute blood loss anemia, Asthma, Chest pain, COPD (chronic obstructive pulmonary disease) ([address] Coronary artery disease, Depression, Diabetes mellitus ([address] Enlarged heart, Fatigue, Generalized headaches, Hyperlipidemia, Hypertension, Migraine, Morning stiffness of joints, Myocardial infarction ([address] Numbness or tingling, OA (osteoarthritis) of knee, Obesity, Class I, BMI 30.0-34.9 (see actual BMI), Osteoporosis, Pulmonary nodule, and Shortness of femoral pulse, 2+ R DP/PT pulse    Data Reviewed:      Coronary angiogram 4/9/2019:  LM: short, luminals   LAD: moderate diffuse disease, mid 80%, distal stent with 50% restenosis and HERO-2 flow  LCX: mid patent stent,  50% into OM1 just until anastomosis site. (signficant improvement with iC NTG)  LVEDP: 11  LVEF: 45% with mild ant HK  PCI of prox and mid LAD  STENT: Resolute [place] 3 x 22mm followed by 3 x 38mm. prox was post dilated to 3.5mm and mid was dilated to 3.25mm. Distal stent by OCT showed more restenosis then would expect for new stent and initially poor distal flow so POBA'd this section using 2.25 x 15mm NC trek  1. Successful PCI to prox and mid LAD using CSI orbital arthrectomy and 2 ame drug stents. 0% residual and HERO-3 flow. OCT showed heavy calcific and fibroatheroma [place] and good stent expansion,   2. POBA of distal LAD stent for restenosis and HERO-2 flow. 0% residual and HERO-3 flow   3. Cont ASA 81mg po qday for life  4. Plavix 75mg poqday for life as tolerated  5.  Integrillin for 6 hrs     Coronary angiogram 2/6/2019:  LM: luminals  LAD: mid 60%, mid/distal 60% and distal small vessel. NO significant competitive flow seen  LCX: luminals, patent mid LCx stent extending into OM1,                 OM1- superior branch with 30% long lesion and +flow up SVG graft, inferior branch small with 70% lesions  RCA: dominant,  Moderate-severe diffuse disease, long lesion of 50% in [id number]. SVG-PDA graft seen with retrograde flow  LIMA-LAD: atretic, no significantflow to native LAD  SVG-OM2: patent, small vein graft (prx and mid graft is small in size, plumps up distally, + valve seen. Good antegrade flow  SVG-RT PDA: patent  LVEDP: 25  LVEF: 45% with lateral wall hypokinesis  FFR: mid LAD 0.93-->0.83  FFR of mid and distal lesion 0.77.  Unable to get post FFR as FFR machine went down  PCI of lesion 1: mid/distal LAD  Resolute [place] 2 x 26mm, post dilated to 2 mm  1. Patent mid LCx stent, interval closure of LIMA graft. 2 lesions in LAD were FFR'd and distal lesion ischemic and stented using [place] drug stent 2 x 26mm with HERO-3 flow  2. Watch mid LAD lesion for ischemic progression  3. ASA 81mg poqday for life  4. Plavix 75mg poqday for 1 yr w/o inturruption  5. BB, statin.     Stress test 3/18/2019:  Small-moderate sized inferolateral partial reversibility defect consistent    with ischemia and infarction in the territory of the mid and distal LCx    and/or RCA. Fixed defects in the mid and basal lateral walls c/w infarct.    Hyperdynamic LV systolic function with MW>99% with uniform wall motion.    Intermediate risk abnormal study.      Echo 1/61/0500:  LV systolic function is normal with EF estimated at 55%.   Mild hypokinesis of the inferior and lateral walls.   No regional wall motion abnormalities.   Normal left ventricular diastolic filling pressure.   Trivial tricuspid regurgitation.   Systolic pulmonary artery pressure (SPAP) is normal estimated at 24mmHg   (Right atrial pressure of 3mmHg).    CABG 6/13/2018:  Elective CABG X 3, with pedicled LIMA to LAD, single Greater Saphenous VG to OM 2, separate single Greater SVG to PDA of RCA, SGC, CPB, EVH Right Greater Saphenous vein, EDUARDO, Epiaortic ultrasound, Doppler verification of grafts, Bilateral 5 level intercostal nerve block(Exparel), Platelet gel application. Cardiology Labs Reviewed:   CBC: No results for input(s): WBC, HGB, HCT, PLT in the last 72 hours. BMP:No results for input(s): NA, K, CO2, BUN, CREATININE, LABGLOM, GLUCOSE in the last 72 hours. PT/INR: No results for input(s): PROTIME, INR in the last 72 hours. APTT:No results for input(s): APTT in the last 72 hours. FASTING LIPID PANEL:  Lab Results   Component Value Date    HDL 44 04/09/2019    HDL 34 04/21/2010    LDLCALC 50 04/09/2019    TRIG 111 04/09/2019     LIVER PROFILE:No results for input(s): AST, ALT, ALB in the last 72 hours.   BNP:   Lab Results   Component Value Date    PROBNP 1,848 05/14/2018    PROBNP 82 10/12/2017     Reviewed all labs and imaging today    Assessment:   Chronic angina: improved  CAD: stable, s/p CSI, MARCOS x2 LAD and POBA pre existing dLAD stent 4/9/19               - s/p MARCOS dLAD 2/6/19               - s/p CABG x3 6/2018               - s/p MARCOS LCx and POBA LAD, IABP in the setting of STEMI 5/2018              - s/p nonobstructive angiogram 4/2016 and 2013  Ischemic cardiomyopathy: stable, EF 55% improved from 40-45% 5/2018  HTN: controlled  HLD: stable, on statin, labs reviewed  DM: hgb a1c 7.1  COPD     Plan:   1. Cardiac rehab referral  2. Continue aspirin, lipitor, plavix, lisinopril, toprol  3. Encouraged diet, exercise, DM management  4. Follow up as scheduled with Dr. Chio Joseph. Ok to move appointment out 3 months but patient prefers to keep original appointment.     Talat Young, APRN-CNP  [address]  [phone] on 4 L nasal cannula ---> NOW on RA --> perform 6 mns walk and monitor SPO2  cxr neg  neg DD  -CW  dexamethasone for 10 days total   - continue to monitor sats  - isolation  - monitor inflammatrory markers   - CW  Dexamethasone ( D2) , Patient does not want to stay in the hospital because of insurance issues , will discuss to see if she can stay for at least 3 days , son now says it is ok to complete the 5 days   - lovenox ppx on 4 L nasal cannula ---> NOW on RA  cxr neg  neg DD  -CW  dexamethasone for 10 days total   - continue to monitor sats  - isolation  - monitor inflammatrory markers   - CW  Dexamethasone ( to be completed on 11/4 , then DC home if stable   - lovenox ppx

## 2022-11-03 NOTE — DISCHARGE NOTE PROVIDER - HOSPITAL COURSE
67F with PMH DM2, HTN and HLD presents with hypertension and COVID concerning for pneumonia. 67F with pmhx DM, HTN and HLD, was prescribed medications but has not been taking it presents with high blood pressures  (up to 185/105 at home) and tested positive for COVID with headache, cp, sob, chills and muscle aches. She reports that she also tested herself at home for covid today and was has been feeling sick for the past few days. Per son, patient has not seen a PMD in over three years.     Problem/Plan - 1:  ·  Problem: Hypertension.  c/o HA and mild blurry vision with no findings on exam  - CT head no acute ICH or acute pathology, but chronic microvascular ischemic changes and indeterminate age infarct.   - Continue Amlodipine 7.5 mg oral daily   - WILL NEED TO BE STARTED ON ARB as oupt  and will need tighter BP control at home     Problem/Plan - 2:  ·  Problem: 2019 novel coronavirus disease (COVID-19) with Hypoxia  - Required 4 L nasal cannula ---> NOW on RA  - CXR neg  - Neg D-Dimers  - S/P Remdesivir 10/31 - 11/4  -  Dexamethasone for 10 days total      Problem/Plan - 3:  ·  Problem: Hyperlipidemia  - Not on any home medication  - Lipid profile is elevated  - Started Statin in AM and monitor LFT as outpt.       Problem/Plan - 4:  ·  Problem: Diabetes mellitus  - Not on any home meds and NOT controlled   - A1c= 11   - Insulin - Basal and Lispro per endocrine recs started in hospital  - Endo/Nutritionist and DM educator consults, endo rec is noted.    Awaiting Endocrine recs 67F with pmhx DM, HTN and HLD, was prescribed medications but has not been taking it presents with high blood pressures  (up to 185/105 at home) and tested positive for COVID with headache, cp, sob, chills and muscle aches. She reports that she also tested herself at home for covid today and was has been feeling sick for the past few days. Per son, patient has not seen a PMD in over three years.     Problem/Plan - 1:  ·  Problem: 2019 novel coronavirus disease (COVID-19) with Hypoxia  - Required 4 L nasal cannula ---> NOW on RA  - CXR neg  - Neg D-Dimers  - S/P Remdesivir 10/31 - 11/4  -  Dexamethasone for 10 days total   SPO2 is stable on Walking      Problem/Plan - :  ·  Problem: Hypertension.  c/o HA and mild blurry vision with no findings on exam  - CT head no acute ICH or acute pathology, but chronic microvascular ischemic changes and indeterminate age infarct.   - Continue Amlodipine 7.5 mg oral daily   - WILL NEED TO BE STARTED ON ARB as oupt  and will need tighter BP control at home and will need to monitor renal function      Problem/Plan - 3:  ·  Problem: Hyperlipidemia  - Not on any home medication  - Lipid profile is elevated  - Pt will Need to be started on STATIN as outp , Pt has COVID and was on Remdesivir---> I held on starting statin not to have a 3rd factor that might cause transaminitis , low cholesterol diet        Problem/Plan - 4:  ·  Problem: Diabetes mellitus  - Not on any home meds and NOT controlled   - A1c= 11   - Insulin - Basal and Lispro per endocrine recs started in hospital  - Endo/Nutritionist and DM educator consults, endo rec is noted.    Awaiting Endocrine recs 67F with pmhx DM, HTN and HLD, was prescribed medications but has not been taking it presents with high blood pressures  (up to 185/105 at home) and tested positive for COVID with headache, cp, sob, chills and muscle aches. She reports that she also tested herself at home for covid today and was has been feeling sick for the past few days. Per son, patient has not seen a PMD in over three years.     Problem/Plan - 1:  ·  Problem: 2019 novel coronavirus disease (COVID-19) with Hypoxia  - Required 4 L nasal cannula ---> NOW on RA  - CXR neg  - Neg D-Dimers  - S/P Remdesivir 10/31 - 11/4  -  Dexamethasone for 10 days total   SPO2 is stable on Walking      Problem/Plan - :  ·  Problem: Hypertension.  c/o HA and mild blurry vision with no findings on exam  - CT head no acute ICH or acute pathology, but chronic microvascular ischemic changes and indeterminate age infarct.   - Continue Amlodipine 7.5 mg oral daily   - WILL NEED TO BE STARTED ON ARB as oupt  and will need tighter BP control at home and will need to monitor renal function      Problem/Plan - 3:  ·  Problem: Hyperlipidemia  - Not on any home medication  - Lipid profile is elevated  - Pt will Need to be started on STATIN as outp , Pt has COVID and was on Remdesivir---> I held on starting statin not to have a 3rd factor that might cause transaminitis , low cholesterol diet        Problem/Plan - 4:  ·  Problem: Diabetes mellitus  - Not on any home meds and NOT controlled   - A1c= 11   - Insulin - Basal and Lispro per endocrine recs started in hospital  - Endo/Nutritionist and DM educator consults.    Discussed glycemic goal of a1c <7% to prevent microvascular complications of diabetes mellitus and reduce the risk of macrovascular complications.   Recommend annual podiatry and ophthalmology follow up.   Glucose goal of 100-180mg/dl while in patient.   Recommend Lantus 20 units at bedtime  Recommend Admelog 15 units tid with meals  Recommend MDSS (2:50 above 150mg/dl) and night time low does sliding scale (2:50 above 250mg/dl) for hyperglycemia corrections    Discharge recommendation:  While ON dexamethasone: (patient will c/w dex 6mg daily for 5 more days)  Discharge on Long acting insulin (20 Units at night) and short acting insulin (15 units before meals)  When OFF dexamethasone:  Recommend long acting insulin (12 units HS)  Discontinue short acting insulin before meals  Start Metformin and titrate up to 1000mg bid  Metformin: Week 1- 500mg po bid, week 2: 500mg po qam/1000mg po pqm, and week 3: 1000mg po bid     Patient needs to follow up with PMD within 1 week  She can follow up at [facility]ocrine Office [address] [place] [address] [phone]    Awaiting Endocrine recs 67F with pmhx DM, HTN and HLD, was prescribed medications but has not been taking it presents with high blood pressures  (up to 185/105 at home) and tested positive for COVID with headache, cp, sob, chills and muscle aches. She reports that she also tested herself at home for covid today and was has been feeling sick for the past few days. Per son, patient has not seen a PMD in over three years.     Problem/Plan - 1:  ·  Problem: 2019 novel coronavirus disease (COVID-19) with Hypoxia  - Required 4 L nasal cannula ---> NOW on RA  - CXR neg  - Neg D-Dimers  - S/P Remdesivir 10/31 - 11/4  -  Dexamethasone for 10 days total   SPO2 is stable on Walking      Problem/Plan - :  ·  Problem: Hypertension.  c/o HA and mild blurry vision with no findings on exam  - CT head no acute ICH or acute pathology, but chronic microvascular ischemic changes and indeterminate age infarct.   - Continue Amlodipine 7.5 mg oral daily   - WILL NEED TO BE STARTED ON ARB as oupt  and will need tighter BP control at home and will need to monitor renal function      Problem/Plan - 3:  ·  Problem: Hyperlipidemia  - Not on any home medication  - Lipid profile is elevated  - Pt will Need to be started on STATIN as outpt , Pt has COVID and was on Remdesivir---> I held on starting statin not to have a 3rd factor that might cause transaminitis , low cholesterol diet        Problem/Plan - 4:  ·  Problem: Diabetes mellitus  - Not on any home meds and NOT controlled   - A1c= 11   - Insulin - Basal and Lispro per endocrine recs started in hospital  - Endo/Nutritionist and DM educator consults.    Discussed glycemic goal of a1c <7% to prevent microvascular complications of diabetes mellitus and reduce the risk of macrovascular complications.   Recommend annual podiatry and ophthalmology follow up.   Glucose goal of 100-180mg/dl while in patient.   Recommend Lantus 20 units at bedtime  Recommend Admelog 15 units tid with meals  Recommend MDSS (2:50 above 150mg/dl) and night time low does sliding scale (2:50 above 250mg/dl) for hyperglycemia corrections    Discharge recommendation:  While ON dexamethasone: (patient will c/w dex 6mg daily for 5 more days)  Discharge on Long acting insulin (20 Units at night) and short acting insulin (15 units before meals)  When OFF dexamethasone:  Recommend long acting insulin (12 units HS)  Discontinue short acting insulin before meals  Start Metformin and titrate up to 1000mg bid  Metformin: Week 1- 500mg po bid, week 2: 500mg po qam/1000mg po pqm, and week 3: 1000mg po bid     Patient needs to follow up with PMD within 1 week  She can follow up at Endocrine Office [address] [place] [address] [phone]    Awaiting Endocrine recs 67F with pmhx DM, HTN and HLD, was prescribed medications but has not been taking it presents with high blood pressures  (up to 185/105 at home) and tested positive for COVID with headache, cp, sob, chills and muscle aches. She reports that she also tested herself at home for covid today and was has been feeling sick for the past few days. Per son, patient has not seen a PMD in over three years.     Problem/Plan - 1:  ·  Problem: 2019 novel coronavirus disease (COVID-19) with Hypoxia  - Required 4 L nasal cannula ---> NOW on RA  - CXR neg  - Neg D-Dimers  - S/P Remdesivir 10/31 - 11/4  -  Dexamethasone for 10 days total   SPO2 is stable on Walking      Problem/Plan - :  ·  Problem: Hypertension.  c/o HA and mild blurry vision with no findings on exam  - CT head no acute ICH or acute pathology, but chronic microvascular ischemic changes and indeterminate age infarct.   - Continue Amlodipine 7.5 mg oral daily   - WILL NEED TO BE STARTED ON ARB as oupt  and will need tighter BP control at home and will need to monitor renal function      Problem/Plan - 3:  ·  Problem: Hyperlipidemia  - Not on any home medication  - Lipid profile is elevated  - Pt will Need to be started on STATIN as outpt , Pt has COVID and was on Remdesivir---> I held on starting statin not to have a 3rd factor that might cause transaminitis , low cholesterol diet        Problem/Plan - 4:  ·  Problem: Diabetes mellitus  - Not on any home meds and NOT controlled   - A1c= 11   - Insulin - Basal and Lispro per endocrine recs started in hospital  - Endo/Nutritionist and DM educator consults    Discharge recommendation:  While ON dexamethasone: (patient will c/w dex 6mg daily for 5 more days)  Discharge on Long acting insulin (20 Units at night) and short acting insulin (15 units before meals)  When OFF dexamethasone:  Recommend long acting insulin (12 units HS)  Discontinue short acting insulin before meals  Start Metformin and titrate up to 1000mg bid  Metformin: Week 1- 500mg po bid, week 2: 500mg po qam/1000mg po pqm, and week 3: 1000mg po bid     Patient needs to follow up with PMD within 1 week  She can follow up at Endocrine Office [address] [place] [address] [phone]    Awaiting Endocrine recs

## 2022-11-03 NOTE — PROGRESS NOTE ADULT - SUBJECTIVE AND OBJECTIVE BOX
Patient is a 67y old  Female who presents with a chief complaint of hypertension (02 Nov 2022 14:56)      SUBJECTIVE / OVERNIGHT EVENTS:    Tele reviewed:       ADDITIONAL REVIEW OF SYSTEMS: Negative except for above    MEDICATIONS  (STANDING):  amLODIPine   Tablet 7.5 milliGRAM(s) Oral daily  chlorhexidine 2% Cloths 1 Application(s) Topical daily  dexAMETHasone     Tablet 6 milliGRAM(s) Oral daily  dextrose 5%. 1000 milliLiter(s) (50 mL/Hr) IV Continuous <Continuous>  dextrose 5%. 1000 milliLiter(s) (100 mL/Hr) IV Continuous <Continuous>  dextrose 50% Injectable 25 Gram(s) IV Push once  dextrose 50% Injectable 12.5 Gram(s) IV Push once  dextrose 50% Injectable 25 Gram(s) IV Push once  enoxaparin Injectable 40 milliGRAM(s) SubCutaneous every 24 hours  glucagon  Injectable 1 milliGRAM(s) IntraMuscular once  insulin glargine Injectable (LANTUS) 16 Unit(s) SubCutaneous at bedtime  insulin lispro (ADMELOG) corrective regimen sliding scale   SubCutaneous three times a day before meals  insulin lispro (ADMELOG) corrective regimen sliding scale   SubCutaneous at bedtime  insulin lispro Injectable (ADMELOG) 12 Unit(s) SubCutaneous three times a day before meals  remdesivir  IVPB   IV Intermittent   remdesivir  IVPB 100 milliGRAM(s) IV Intermittent every 24 hours    MEDICATIONS  (PRN):  hydrALAZINE 10 milliGRAM(s) Oral daily PRN Systolic blood pressure >170  hydrALAZINE 10 milliGRAM(s) Oral once PRN Systolic blood pressure >160      CAPILLARY BLOOD GLUCOSE      POCT Blood Glucose.: 267 mg/dL (03 Nov 2022 12:27)  POCT Blood Glucose.: 263 mg/dL (03 Nov 2022 08:26)  POCT Blood Glucose.: 270 mg/dL (02 Nov 2022 21:32)  POCT Blood Glucose.: 278 mg/dL (02 Nov 2022 17:12)    I&O's Summary    02 Nov 2022 07:01  -  03 Nov 2022 07:00  --------------------------------------------------------  IN: 3240 mL / OUT: 0 mL / NET: 3240 mL    03 Nov 2022 07:01  -  03 Nov 2022 15:38  --------------------------------------------------------  IN: 600 mL / OUT: 0 mL / NET: 600 mL        PHYSICAL EXAM:  Vital Signs Last 24 Hrs  T(C): 36.8 (03 Nov 2022 12:45), Max: 36.9 (02 Nov 2022 21:27)  T(F): 98.3 (03 Nov 2022 12:45), Max: 98.4 (02 Nov 2022 21:27)  HR: 84 (03 Nov 2022 12:45) (66 - 85)  BP: 168/88 (03 Nov 2022 12:45) (134/80 - 168/88)  BP(mean): --  RR: 18 (03 Nov 2022 12:45) (18 - 18)  SpO2: 95% (03 Nov 2022 12:45) (94% - 96%)    Parameters below as of 03 Nov 2022 12:45  Patient On (Oxygen Delivery Method): room air        PHYSICAL EXAM:  GENERAL: NAD, well-developed  HEAD:  Atraumatic, Normocephalic  EYES:  conjunctiva and sclera clear  NECK: Supple, No JVD  CHEST/LUNG: Clear to auscultation bilaterally; No wheeze  HEART: Regular rate and rhythm; No murmurs, rubs, or gallops  ABDOMEN: Soft, Nontender, Nondistended; Bowel sounds present  EXTREMITIES:  2+ Peripheral Pulses, No clubbing, cyanosis, or edema  PSYCH: AAOx3  NEUROLOGY: non-focal  SKIN: No rashes or lesions      LABS:                        14.4   12.19 )-----------( 199      ( 02 Nov 2022 06:44 )             42.4     11-02    134<L>  |  99  |  22  ----------------------------<  276<H>  4.4   |  21<L>  |  0.49<L>    Ca    8.9      02 Nov 2022 06:44                  RADIOLOGY & ADDITIONAL TESTS:    Imaging Personally Reviewed:    Electrocardiogram Personally Reviewed:    COORDINATION OF CARE:  Care Discussed with Consultants/Other Providers [Y/N]:  Prior or Outpatient Records Reviewed [Y/N]:     Patient is a 67y old  Female who presents with a chief complaint of hypertension (02 Nov 2022 14:56)      SUBJECTIVE / OVERNIGHT EVENTS: Son translated for patient   NO complaints presented today       ADDITIONAL REVIEW OF SYSTEMS: Negative except for above    MEDICATIONS  (STANDING):  amLODIPine   Tablet 7.5 milliGRAM(s) Oral daily  chlorhexidine 2% Cloths 1 Application(s) Topical daily  dexAMETHasone     Tablet 6 milliGRAM(s) Oral daily  dextrose 5%. 1000 milliLiter(s) (50 mL/Hr) IV Continuous <Continuous>  dextrose 5%. 1000 milliLiter(s) (100 mL/Hr) IV Continuous <Continuous>  dextrose 50% Injectable 25 Gram(s) IV Push once  dextrose 50% Injectable 12.5 Gram(s) IV Push once  dextrose 50% Injectable 25 Gram(s) IV Push once  enoxaparin Injectable 40 milliGRAM(s) SubCutaneous every 24 hours  glucagon  Injectable 1 milliGRAM(s) IntraMuscular once  insulin glargine Injectable (LANTUS) 16 Unit(s) SubCutaneous at bedtime  insulin lispro (ADMELOG) corrective regimen sliding scale   SubCutaneous three times a day before meals  insulin lispro (ADMELOG) corrective regimen sliding scale   SubCutaneous at bedtime  insulin lispro Injectable (ADMELOG) 12 Unit(s) SubCutaneous three times a day before meals  remdesivir  IVPB   IV Intermittent   remdesivir  IVPB 100 milliGRAM(s) IV Intermittent every 24 hours    MEDICATIONS  (PRN):  hydrALAZINE 10 milliGRAM(s) Oral daily PRN Systolic blood pressure >170  hydrALAZINE 10 milliGRAM(s) Oral once PRN Systolic blood pressure >160      CAPILLARY BLOOD GLUCOSE      POCT Blood Glucose.: 267 mg/dL (03 Nov 2022 12:27)  POCT Blood Glucose.: 263 mg/dL (03 Nov 2022 08:26)  POCT Blood Glucose.: 270 mg/dL (02 Nov 2022 21:32)  POCT Blood Glucose.: 278 mg/dL (02 Nov 2022 17:12)    I&O's Summary    02 Nov 2022 07:01  -  03 Nov 2022 07:00  --------------------------------------------------------  IN: 3240 mL / OUT: 0 mL / NET: 3240 mL    03 Nov 2022 07:01  -  03 Nov 2022 15:38  --------------------------------------------------------  IN: 600 mL / OUT: 0 mL / NET: 600 mL        PHYSICAL EXAM:  Vital Signs Last 24 Hrs  T(C): 36.8 (03 Nov 2022 12:45), Max: 36.9 (02 Nov 2022 21:27)  T(F): 98.3 (03 Nov 2022 12:45), Max: 98.4 (02 Nov 2022 21:27)  HR: 84 (03 Nov 2022 12:45) (66 - 85)  BP: 168/88 (03 Nov 2022 12:45) (134/80 - 168/88)  BP(mean): --  RR: 18 (03 Nov 2022 12:45) (18 - 18)  SpO2: 95% (03 Nov 2022 12:45) (94% - 96%)    Parameters below as of 03 Nov 2022 12:45  Patient On (Oxygen Delivery Method): room air        PHYSICAL EXAM:  GENERAL: NAD, well-developed  HEAD:  Atraumatic, Normocephalic  EYES:  conjunctiva and sclera clear  NECK: Supple, No JVD  CHEST/LUNG: Clear to auscultation bilaterally; No wheeze  HEART: Regular rate and rhythm; No murmurs, rubs, or gallops  ABDOMEN: Soft, Nontender, Nondistended; Bowel sounds present  EXTREMITIES:  2+ Peripheral Pulses, No clubbing, cyanosis, or edema  PSYCH: AAOx3  NEUROLOGY: non-focal  SKIN: No rashes or lesions      LABS:                        14.4   12.19 )-----------( 199      ( 02 Nov 2022 06:44 )             42.4     11-02    134<L>  |  99  |  22  ----------------------------<  276<H>  4.4   |  21<L>  |  0.49<L>    Ca    8.9      02 Nov 2022 06:44                  RADIOLOGY & ADDITIONAL TESTS:    Imaging Personally Reviewed:    Electrocardiogram Personally Reviewed:    COORDINATION OF CARE:  Care Discussed with Consultants/Other Providers [Y/N]:  Prior or Outpatient Records Reviewed [Y/N]:

## 2022-11-03 NOTE — PROGRESS NOTE ADULT - SUBJECTIVE AND OBJECTIVE BOX
Contact info:   Yves Haynes MD  pager [phone], please provide 10 digit call back number.   You may also contact me on [employer] Teams during business hours today.   Please note that this patient may be followed by another provider tomorrow.   If no answer or after hours, please contact [phone]    Interval History/Subjective:  No acute event overnight.   Patient reports eating well.     MEDICATIONS  (STANDING):  amLODIPine   Tablet 7.5 milliGRAM(s) Oral daily  chlorhexidine 2% Cloths 1 Application(s) Topical daily  dexAMETHasone     Tablet 6 milliGRAM(s) Oral daily  dextrose 5%. 1000 milliLiter(s) (50 mL/Hr) IV Continuous <Continuous>  dextrose 5%. 1000 milliLiter(s) (100 mL/Hr) IV Continuous <Continuous>  dextrose 50% Injectable 25 Gram(s) IV Push once  dextrose 50% Injectable 12.5 Gram(s) IV Push once  dextrose 50% Injectable 25 Gram(s) IV Push once  enoxaparin Injectable 40 milliGRAM(s) SubCutaneous every 24 hours  glucagon  Injectable 1 milliGRAM(s) IntraMuscular once  insulin glargine Injectable (LANTUS) 16 Unit(s) SubCutaneous at bedtime  insulin lispro (ADMELOG) corrective regimen sliding scale   SubCutaneous three times a day before meals  insulin lispro (ADMELOG) corrective regimen sliding scale   SubCutaneous at bedtime  insulin lispro Injectable (ADMELOG) 12 Unit(s) SubCutaneous three times a day before meals  remdesivir  IVPB   IV Intermittent   remdesivir  IVPB 100 milliGRAM(s) IV Intermittent every 24 hours    MEDICATIONS  (PRN):  hydrALAZINE 10 milliGRAM(s) Oral daily PRN Systolic blood pressure >170  hydrALAZINE 10 milliGRAM(s) Oral once PRN Systolic blood pressure >160      Allergies    No Known Allergies    Intolerances      Review of Systems:  Constitutional: No fever  Eyes: No blurry vision  Neuro: No tremors  HEENT: No pain  Cardiovascular: No chest pain, palpitations  Respiratory: No SOB, no cough  GI: No nausea, vomiting, abdominal pain  : No dysuria  Skin: no rash  Psych: no depression  Endocrine: no polyuria, polydipsia  Hem/lymph: no swelling    ALL OTHER SYSTEMS REVIEWED AND NEGATIVE    PHYSICAL EXAM:  VITALS: T(C): 36.8 (11-03-22 @ 12:45)  T(F): 98.3 (11-03-22 @ 12:45), Max: 98.4 (11-02-22 @ 21:27)  HR: 84 (11-03-22 @ 12:45) (66 - 85)  BP: 168/88 (11-03-22 @ 12:45) (134/80 - 168/88)  RR:  (18 - 18)  SpO2:  (94% - 96%)  Wt(kg): --  GENERAL: NAD  EYES: No proptosis, no lid lag, anicteric  HEENT:  Atraumatic, Normocephalic, moist mucous membranes  RESPIRATORY: nonlabored respirations  PSYCH: Alert and oriented x 3, normal affect, normal mood      POCT Blood Glucose.: 267 mg/dL (11-03-22 @ 12:27)  POCT Blood Glucose.: 263 mg/dL (11-03-22 @ 08:26)  POCT Blood Glucose.: 270 mg/dL (11-02-22 @ 21:32)  POCT Blood Glucose.: 278 mg/dL (11-02-22 @ 17:12)  POCT Blood Glucose.: 243 mg/dL (11-02-22 @ 12:47)  POCT Blood Glucose.: 277 mg/dL (11-02-22 @ 08:53)  POCT Blood Glucose.: 381 mg/dL (11-01-22 @ 22:18)  POCT Blood Glucose.: 306 mg/dL (11-01-22 @ 18:02)  POCT Blood Glucose.: 355 mg/dL (11-01-22 @ 12:54)  POCT Blood Glucose.: 281 mg/dL (11-01-22 @ 09:02)  POCT Blood Glucose.: 258 mg/dL (11-01-22 @ 01:17)  POCT Blood Glucose.: 413 mg/dL (10-31-22 @ 21:34)  POCT Blood Glucose.: 245 mg/dL (10-31-22 @ 18:11)      11-02    134<L>  |  99  |  22  ----------------------------<  276<H>  4.4   |  21<L>  |  0.49<L>    eGFR: 105    Ca    8.9      11-02    TPro  7.4  /  Alb  4.0  /  TBili  0.5  /  DBili  x   /  AST  17  /  ALT  16  /  AlkPhos  118  11-01        Thyroid Function Tests:

## 2022-11-03 NOTE — PROGRESS NOTE ADULT - PROBLEM SELECTOR PLAN 4
not on any home meds and NOT controlled   - A1c= 11   -  Icrease Admelog   - Increase lantus  - Endo/Nutritionist and DM educator consults not on any home meds and NOT controlled   - A1c= 11   -  Icrease Admelog   - Increase lantus  - Endo/Nutritionist and DM educator consults, endo rec is noted

## 2022-11-03 NOTE — PROGRESS NOTE ADULT - PROBLEM SELECTOR PLAN 3
not on any home meds  Am lipid profile not on any home meds  Am lipid profile is noted , Will start Statin in AM and monitor LFT as outpt

## 2022-11-04 ENCOUNTER — TRANSCRIPTION ENCOUNTER (OUTPATIENT)
Age: 67
End: 2022-11-04

## 2022-11-04 VITALS
SYSTOLIC BLOOD PRESSURE: 142 MMHG | HEART RATE: 77 BPM | OXYGEN SATURATION: 94 % | DIASTOLIC BLOOD PRESSURE: 84 MMHG | RESPIRATION RATE: 18 BRPM | TEMPERATURE: 98 F

## 2022-11-04 LAB
GLUCOSE BLDC GLUCOMTR-MCNC: 226 MG/DL — HIGH (ref 70–99)
GLUCOSE BLDC GLUCOMTR-MCNC: 233 MG/DL — HIGH (ref 70–99)
GLUCOSE BLDC GLUCOMTR-MCNC: 319 MG/DL — HIGH (ref 70–99)

## 2022-11-04 PROCEDURE — 36415 COLL VENOUS BLD VENIPUNCTURE: CPT

## 2022-11-04 PROCEDURE — 85014 HEMATOCRIT: CPT

## 2022-11-04 PROCEDURE — 99232 SBSQ HOSP IP/OBS MODERATE 35: CPT

## 2022-11-04 PROCEDURE — 93971 EXTREMITY STUDY: CPT

## 2022-11-04 PROCEDURE — 83605 ASSAY OF LACTIC ACID: CPT

## 2022-11-04 PROCEDURE — 99285 EMERGENCY DEPT VISIT HI MDM: CPT

## 2022-11-04 PROCEDURE — 85027 COMPLETE CBC AUTOMATED: CPT

## 2022-11-04 PROCEDURE — 84295 ASSAY OF SERUM SODIUM: CPT

## 2022-11-04 PROCEDURE — 96374 THER/PROPH/DIAG INJ IV PUSH: CPT

## 2022-11-04 PROCEDURE — 82947 ASSAY GLUCOSE BLOOD QUANT: CPT

## 2022-11-04 PROCEDURE — 71045 X-RAY EXAM CHEST 1 VIEW: CPT

## 2022-11-04 PROCEDURE — 83735 ASSAY OF MAGNESIUM: CPT

## 2022-11-04 PROCEDURE — 83036 HEMOGLOBIN GLYCOSYLATED A1C: CPT

## 2022-11-04 PROCEDURE — 86803 HEPATITIS C AB TEST: CPT

## 2022-11-04 PROCEDURE — 85018 HEMOGLOBIN: CPT

## 2022-11-04 PROCEDURE — 82803 BLOOD GASES ANY COMBINATION: CPT

## 2022-11-04 PROCEDURE — 82330 ASSAY OF CALCIUM: CPT

## 2022-11-04 PROCEDURE — 84484 ASSAY OF TROPONIN QUANT: CPT

## 2022-11-04 PROCEDURE — 85379 FIBRIN DEGRADATION QUANT: CPT

## 2022-11-04 PROCEDURE — 82565 ASSAY OF CREATININE: CPT

## 2022-11-04 PROCEDURE — 80048 BASIC METABOLIC PNL TOTAL CA: CPT

## 2022-11-04 PROCEDURE — 80053 COMPREHEN METABOLIC PANEL: CPT

## 2022-11-04 PROCEDURE — 87637 SARSCOV2&INF A&B&RSV AMP PRB: CPT

## 2022-11-04 PROCEDURE — 82962 GLUCOSE BLOOD TEST: CPT

## 2022-11-04 PROCEDURE — 85025 COMPLETE CBC W/AUTO DIFF WBC: CPT

## 2022-11-04 PROCEDURE — 82435 ASSAY OF BLOOD CHLORIDE: CPT

## 2022-11-04 PROCEDURE — 80061 LIPID PANEL: CPT

## 2022-11-04 PROCEDURE — 84132 ASSAY OF SERUM POTASSIUM: CPT

## 2022-11-04 PROCEDURE — 99239 HOSP IP/OBS DSCHRG MGMT >30: CPT

## 2022-11-04 PROCEDURE — 70450 CT HEAD/BRAIN W/O DYE: CPT | Mod: MA

## 2022-11-04 RX ORDER — INSULIN LISPRO 100/ML
12 VIAL (ML) SUBCUTANEOUS
Qty: 2 | Refills: 0
Start: 2022-11-04 | End: 2022-11-08

## 2022-11-04 RX ORDER — METFORMIN HYDROCHLORIDE 850 MG/1
1 TABLET ORAL
Qty: 90 | Refills: 0
Start: 2022-11-04 | End: 2022-12-03

## 2022-11-04 RX ORDER — AMLODIPINE BESYLATE 2.5 MG/1
3 TABLET ORAL
Qty: 90 | Refills: 0
Start: 2022-11-04 | End: 2022-12-03

## 2022-11-04 RX ORDER — ENOXAPARIN SODIUM 100 MG/ML
16 INJECTION SUBCUTANEOUS
Qty: 5 | Refills: 0
Start: 2022-11-04 | End: 2022-12-03

## 2022-11-04 RX ORDER — INSULIN LISPRO 100/ML
15 VIAL (ML) SUBCUTANEOUS
Qty: 2 | Refills: 0
Start: 2022-11-04 | End: 2022-11-08

## 2022-11-04 RX ORDER — ASPIRIN/CALCIUM CARB/MAGNESIUM 324 MG
1 TABLET ORAL
Qty: 30 | Refills: 0
Start: 2022-11-04 | End: 2022-12-03

## 2022-11-04 RX ORDER — ENOXAPARIN SODIUM 100 MG/ML
20 INJECTION SUBCUTANEOUS
Qty: 5 | Refills: 0
Start: 2022-11-04 | End: 2022-12-03

## 2022-11-04 RX ORDER — DEXAMETHASONE 0.5 MG/5ML
1 ELIXIR ORAL
Qty: 5 | Refills: 0
Start: 2022-11-04 | End: 2022-11-08

## 2022-11-04 RX ADMIN — Medication 4: at 17:58

## 2022-11-04 RX ADMIN — Medication 12 UNIT(S): at 09:34

## 2022-11-04 RX ADMIN — Medication 4: at 09:34

## 2022-11-04 RX ADMIN — Medication 12 UNIT(S): at 17:58

## 2022-11-04 RX ADMIN — REMDESIVIR 500 MILLIGRAM(S): 5 INJECTION INTRAVENOUS at 14:37

## 2022-11-04 RX ADMIN — Medication 12 UNIT(S): at 12:37

## 2022-11-04 RX ADMIN — AMLODIPINE BESYLATE 7.5 MILLIGRAM(S): 2.5 TABLET ORAL at 05:52

## 2022-11-04 RX ADMIN — Medication 8: at 12:35

## 2022-11-04 RX ADMIN — ENOXAPARIN SODIUM 40 MILLIGRAM(S): 100 INJECTION SUBCUTANEOUS at 05:51

## 2022-11-04 NOTE — PROGRESS NOTE ADULT - SUBJECTIVE AND OBJECTIVE BOX
Patient is a 67y old  Female who presents with a chief complaint of hypertension (03 Nov 2022 18:13)      SUBJECTIVE / OVERNIGHT EVENTS:  no overnight events       ADDITIONAL REVIEW OF SYSTEMS: Negative except for above    MEDICATIONS  (STANDING):  amLODIPine   Tablet 7.5 milliGRAM(s) Oral daily  chlorhexidine 2% Cloths 1 Application(s) Topical daily  dexAMETHasone     Tablet 6 milliGRAM(s) Oral daily  dextrose 5%. 1000 milliLiter(s) (50 mL/Hr) IV Continuous <Continuous>  dextrose 5%. 1000 milliLiter(s) (100 mL/Hr) IV Continuous <Continuous>  dextrose 50% Injectable 25 Gram(s) IV Push once  dextrose 50% Injectable 12.5 Gram(s) IV Push once  dextrose 50% Injectable 25 Gram(s) IV Push once  enoxaparin Injectable 40 milliGRAM(s) SubCutaneous every 24 hours  glucagon  Injectable 1 milliGRAM(s) IntraMuscular once  insulin glargine Injectable (LANTUS) 16 Unit(s) SubCutaneous at bedtime  insulin lispro (ADMELOG) corrective regimen sliding scale   SubCutaneous three times a day before meals  insulin lispro (ADMELOG) corrective regimen sliding scale   SubCutaneous at bedtime  insulin lispro Injectable (ADMELOG) 12 Unit(s) SubCutaneous three times a day before meals    MEDICATIONS  (PRN):  hydrALAZINE 10 milliGRAM(s) Oral daily PRN Systolic blood pressure >170  hydrALAZINE 10 milliGRAM(s) Oral once PRN Systolic blood pressure >160      CAPILLARY BLOOD GLUCOSE      POCT Blood Glucose.: 319 mg/dL (04 Nov 2022 12:30)  POCT Blood Glucose.: 226 mg/dL (04 Nov 2022 09:19)  POCT Blood Glucose.: 255 mg/dL (03 Nov 2022 21:13)  POCT Blood Glucose.: 257 mg/dL (03 Nov 2022 17:35)    I&O's Summary    03 Nov 2022 07:01  -  04 Nov 2022 07:00  --------------------------------------------------------  IN: 1620 mL / OUT: 0 mL / NET: 1620 mL    04 Nov 2022 07:01  -  04 Nov 2022 15:16  --------------------------------------------------------  IN: 600 mL / OUT: 0 mL / NET: 600 mL        PHYSICAL EXAM:  Vital Signs Last 24 Hrs  T(C): 36.8 (04 Nov 2022 12:27), Max: 37.1 (03 Nov 2022 22:35)  T(F): 98.2 (04 Nov 2022 12:27), Max: 98.7 (03 Nov 2022 22:35)  HR: 87 (04 Nov 2022 12:27) (71 - 87)  BP: 148/87 (04 Nov 2022 12:27) (148/87 - 157/84)  BP(mean): --  RR: 18 (04 Nov 2022 12:27) (18 - 18)  SpO2: 93% (04 Nov 2022 12:27) (93% - 99%)    Parameters below as of 04 Nov 2022 12:20  Patient On (Oxygen Delivery Method): room air        PHYSICAL EXAM:  GENERAL: NAD, well-developed  HEAD:  Atraumatic, Normocephalic  EYES:  conjunctiva and sclera clear  NECK: Supple, No JVD  CHEST/LUNG: Clear to auscultation bilaterally; No wheeze  HEART: Regular rate and rhythm; No murmurs, rubs, or gallops  ABDOMEN: Soft, Nontender, Nondistended; Bowel sounds present  EXTREMITIES:  2+ Peripheral Pulses, No clubbing, cyanosis, or edema  PSYCH: AAOx3  NEUROLOGY: non-focal  SKIN: No rashes or lesions      LABS:                      RADIOLOGY & ADDITIONAL TESTS:    Imaging Personally Reviewed:    Electrocardiogram Personally Reviewed:    COORDINATION OF CARE:  Care Discussed with Consultants/Other Providers [Y/N]:  Prior or Outpatient Records Reviewed [Y/N]:

## 2022-11-04 NOTE — DISCHARGE NOTE NURSING/CASE MANAGEMENT/SOCIAL WORK - NSDCPEFALRISK_GEN_ALL_CORE
For information on Fall & Injury Prevention, visit: [email or web address] OR  [email or web address] OR  https://www.cdc.gov/steadi/patient.html

## 2022-11-04 NOTE — PROGRESS NOTE ADULT - PROBLEM SELECTOR PLAN 5
dvt ppx: lovenox  diet: dash

## 2022-11-04 NOTE — PROGRESS NOTE ADULT - PROBLEM SELECTOR PROBLEM 2
2019 novel coronavirus disease (COVID-19)
Hyperlipidemia
Hyperlipidemia
2019 novel coronavirus disease (COVID-19)

## 2022-11-04 NOTE — PROGRESS NOTE ADULT - SUBJECTIVE AND OBJECTIVE BOX
Chief Complaint: DM2    History: Patient will be discharged today and will continue with dexamethasone 6mg daily for 5 more days.     MEDICATIONS  (STANDING):  amLODIPine   Tablet 7.5 milliGRAM(s) Oral daily  chlorhexidine 2% Cloths 1 Application(s) Topical daily  dexAMETHasone     Tablet 6 milliGRAM(s) Oral daily  dextrose 5%. 1000 milliLiter(s) (50 mL/Hr) IV Continuous <Continuous>  dextrose 5%. 1000 milliLiter(s) (100 mL/Hr) IV Continuous <Continuous>  dextrose 50% Injectable 25 Gram(s) IV Push once  dextrose 50% Injectable 12.5 Gram(s) IV Push once  dextrose 50% Injectable 25 Gram(s) IV Push once  enoxaparin Injectable 40 milliGRAM(s) SubCutaneous every 24 hours  glucagon  Injectable 1 milliGRAM(s) IntraMuscular once  insulin glargine Injectable (LANTUS) 16 Unit(s) SubCutaneous at bedtime  insulin lispro (ADMELOG) corrective regimen sliding scale   SubCutaneous three times a day before meals  insulin lispro (ADMELOG) corrective regimen sliding scale   SubCutaneous at bedtime  insulin lispro Injectable (ADMELOG) 12 Unit(s) SubCutaneous three times a day before meals    MEDICATIONS  (PRN):  hydrALAZINE 10 milliGRAM(s) Oral daily PRN Systolic blood pressure >170  hydrALAZINE 10 milliGRAM(s) Oral once PRN Systolic blood pressure >160      Allergies    No Known Allergies    Intolerances      Review of Systems:  Constitutional: No fever  Eyes: No blurry vision  Neuro: No tremors  HEENT: No pain  Cardiovascular: No chest pain, palpitations  Respiratory: No SOB, no cough  GI: No nausea, vomiting, abdominal pain  : No dysuria  Skin: no rash  Psych: no depression  Endocrine: no polyuria, polydipsia      ALL OTHER SYSTEMS REVIEWED AND NEGATIVE        PHYSICAL EXAM:  VITALS: T(C): 36.8 (11-04-22 @ 12:27)  T(F): 98.2 (11-04-22 @ 12:27), Max: 98.7 (11-03-22 @ 22:35)  HR: 87 (11-04-22 @ 12:27) (71 - 87)  BP: 148/87 (11-04-22 @ 12:27) (148/87 - 157/84)  RR:  (18 - 18)  SpO2:  (93% - 99%)  Wt(kg): --  GENERAL: NAD, well-groomed, well-developed  EYES: No proptosis, no lid lag, anicteric  HEENT:  Atraumatic, Normocephalic, moist mucous membranes  RESPIRATORY: Clear to auscultation bilaterally  CARDIOVASCULAR: Regular rate and rhythm  GI: Soft, nontender, non distended, normal bowel sounds  SKIN: Dry, intact, No rashes or lesions  MUSCULOSKELETAL: Full range of motion, normal strength  NEURO: sensation intact, extraocular movements intact, no tremor, normal reflexes  PSYCH: Alert and oriented x 3, normal affect, normal mood      POCT Blood Glucose.: 319 mg/dL (11-04-22 @ 12:30)  POCT Blood Glucose.: 226 mg/dL (11-04-22 @ 09:19)  POCT Blood Glucose.: 255 mg/dL (11-03-22 @ 21:13)  POCT Blood Glucose.: 257 mg/dL (11-03-22 @ 17:35)  POCT Blood Glucose.: 267 mg/dL (11-03-22 @ 12:27)  POCT Blood Glucose.: 263 mg/dL (11-03-22 @ 08:26)  POCT Blood Glucose.: 270 mg/dL (11-02-22 @ 21:32)  POCT Blood Glucose.: 278 mg/dL (11-02-22 @ 17:12)  POCT Blood Glucose.: 243 mg/dL (11-02-22 @ 12:47)  POCT Blood Glucose.: 277 mg/dL (11-02-22 @ 08:53)  POCT Blood Glucose.: 381 mg/dL (11-01-22 @ 22:18)  POCT Blood Glucose.: 306 mg/dL (11-01-22 @ 18:02)      11-02    134<L>  |  99  |  22  ----------------------------<  276<H>  4.4   |  21<L>  |  0.49<L>    eGFR: 105    Ca    8.9      11-02

## 2022-11-04 NOTE — PROGRESS NOTE ADULT - PROBLEM SELECTOR PROBLEM 3
Hyperlipidemia
Diabetes mellitus
Diabetes mellitus
Hyperlipidemia

## 2022-11-04 NOTE — PROGRESS NOTE ADULT - PROBLEM SELECTOR PLAN 1
CT head no acute ICH or acute pathology, but chronic microvascular ischemic changes and indeterminate age infarct.   c/o HA and mild blurry vision with no findings on exam  - S/P oral amlodipine 2.5mg  - amlodipine 5mg qd--> CW Norvasc 7.5 mg oral daily and monitor   - WILL NEED TO BE STARTED ON ARB as oupt  and will need tighter BP control at home   PCP f/up within one week of hosp DC

## 2022-11-04 NOTE — DISCHARGE NOTE NURSING/CASE MANAGEMENT/SOCIAL WORK - PATIENT PORTAL LINK FT
You can access the FollowMyHealth Patient Portal offered by [facility] by registering at the following website: [email or web address]. By joining [employer]’s FollowMyHealth portal, you will also be able to view your health information using other applications (apps) compatible with our system.

## 2022-11-04 NOTE — PROGRESS NOTE ADULT - PROBLEM SELECTOR PLAN 2
on 4 L nasal cannula ---> NOW on RA  cxr neg  neg DD  -CW  dexamethasone for 10 days total   - continue to monitor sats  - isolation  - monitor inflammatrory markers   - CW  Dexamethasone ( to be completed on 11/4 , then DC home if stable   - lovenox ppx

## 2022-11-04 NOTE — PROGRESS NOTE ADULT - PROBLEM SELECTOR PLAN 4
not on any home meds and NOT controlled   - A1c= 11   -  Icrease Admelog   - Increase lantus  - Endo/Nutritionist and DM educator consults, endo rec is noted

## 2022-11-04 NOTE — PROGRESS NOTE ADULT - ASSESSMENT
67F with PMH DM2, HTN and HLD presents with hypertension and COVID concerning for pneumonia.
JESSICA TOMPKINS is a 67y old Cantonese Speaking woman, with history of Type 2 DM, HTN, HLD, presenting with hypertensive urgency, found to have COVID infection, on steroid treatment.  Endocrine consulted for uncontrolled Type 2 DM with hyperglycemia.      Patient is high risk with high level decision making due to uncontrolled diabetes which places patient at high risk for cardiovascular and cerebrovascular events. Patient with lability of glucose requiring close monitoring and insulin adjustments.    1. Type 2 DM  A1C:A1C with Estimated Average Glucose Result: 11.0 % (10-31-22 @ 11:38)  Home regimen: None  Endocrionlogist: None  Currently on dexamethasone  EGFR: eGFR: 105 mL/min/1.73m2 (11-02-22 @ 06:44)  Discussed with patient the importance of Carb consistent diet and exercise as tolerated.    Recommend nutritional consultation  Recommend DM education through RN staff (see physical to RN order)  Discussed glycemic goal of a1c <7% to prevent microvascular complications of diabetes mellitus and reduce the risk of macrovascular complications.   Recommend annual podiatry and ophthalmology follow up.   Glucose goal of 80-180mg/dl while in patient.   Recommend Lantus  16 units at bedtime  Recommend Admelog 12 units tid with meals  Recommend MDSS (2:50 above 150mg/dl) and night time low does sliding scale (2:50 above 250mg/dl) for hyperglycemia corrections    Discharge recommendation/considerations:  Metformin 1000mg bid  Metformin: Week 1- 500mg po bid, week 2: 500mg po qam/1000mg po pqm, and week 3: 1000mg po bid   Basal insulin, does to be determined, can switch to GLP1RA outpatient after eye exam.     2. HTN  BP goal <130/80  Continue with current regimen ]    3. HLD  LDL goal <70gm/dl   Check fasting lipid   Consider statin therapy 
67F with PMH DM2, HTN and HLD presents with hypertension and COVID concerning for pneumonia.
67F with PMH DM2, HTN and HLD presents with hypertension and COVID concerning for pneumonia.
JESSICA TOMPKINS is a 67y old Cantonese Speaking woman, with history of Type 2 DM, HTN, HLD, presenting with hypertensive urgency, found to have COVID infection, on steroid treatment.  Endocrine consulted for uncontrolled Type 2 DM with hyperglycemia.      Patient is high risk with high level decision making due to uncontrolled diabetes which places patient at high risk for cardiovascular and cerebrovascular events. Patient with lability of glucose requiring close monitoring and insulin adjustments.    1. Type 2 DM  A1C:A1C with Estimated Average Glucose Result: 11.0 % (10-31-22 @ 11:38)  Home regimen: None  Endocrionlogist: None  Currently on dexamethasone  Discussed with patient the importance of Carb consistent diet and exercise as tolerated.    Recommend DM education through RN staff (see physical to RN order)  Discussed glycemic goal of a1c <7% to prevent microvascular complications of diabetes mellitus and reduce the risk of macrovascular complications.   Recommend annual podiatry and ophthalmology follow up.   Glucose goal of 100-180mg/dl while in patient.   Recommend Lantus 20 units at bedtime  Recommend Admelog 15 units tid with meals  Recommend MDSS (2:50 above 150mg/dl) and night time low does sliding scale (2:50 above 250mg/dl) for hyperglycemia corrections    Discharge recommendation:  While ON dexamethasone: (patient will c/w dex 6mg daily for 5 more days)  Discharge on Long acting insulin (20 Units at night) and short acting insulin (15 units before meals)  When OFF dexamethasone:  Recommend long acting insulin (12 units HS)  Discontinue short acting insulin before meals  Start Metformin and titrate up to 1000mg bid  Metformin: Week 1- 500mg po bid, week 2: 500mg po qam/1000mg po pqm, and week 3: 1000mg po bid     Patient needs to follow up with PMD within 1 week  Will need insulin pen teaching and glucometer teaching before discharge  Please send scripts for glucometer, test strips, lancets, alcohol swabs, insulin pens and pen needles to patient's pharmacy prior to discharge.  She can follow up at [facility] [address] [place] [address] [place] [phone]    2. HTN  BP goal <130/80  Continue with current regimen     3. HLD    Recommend statin therapy     Discussed with primary team.    Daija Calloway DO
67F with PMH DM2, HTN and HLD presents with hypertension and COVID concerning for pneumonia.
67F with PMH DM2, HTN and HLD presents with hypertension and COVID concerning for pneumonia.

## 2022-11-05 NOTE — CHART NOTE - NSCHARTNOTEFT_GEN_A_CORE
Brief Interim Note  Patient seen for: requested nutrition education (reinforcement)    Current Diet:  Diet, Consistent Carbohydrate/No Snacks:   DASH/TLC {Sodium & Cholesterol Restricted} (DASH) (10-31-22)    PO Intake:  good PO intake per pt's son report;     Interventions in Place:  - Provided education on Carbohydrate Consistent diet including sources of carbohydrates, portion sizes, pairing protein with carbohydrates, limiting sugar sweetened beverages in diet and the importance of consistent eating pattern to help optimize glycemic control.   - Discussed DASH diet education. Reviewed foods high in Na and cholesterol to avoid. Reviewed ways to decrease Na in your diet, discussed meal and snack options, tips for eating out     Recommendations:   - Continue current diet order; continue to monitor labs   - post discharge, follow up with outpt RD/ endo for monitoring glucose levels and nutrition plan   - RD remains available to review diet education and adjust diet recommendations as needed.     Elda Bell RD CDN #[id number]
Left 1 message for patient in regards to follow up care with callback information.

## 2022-11-07 PROBLEM — Z00.00 ENCOUNTER FOR PREVENTIVE HEALTH EXAMINATION: Status: ACTIVE | Noted: 2022-11-07

## 2022-11-09 ENCOUNTER — TRANSCRIPTION ENCOUNTER (OUTPATIENT)
Age: 67
End: 2022-11-09

## 2022-11-10 RX ORDER — METFORMIN HYDROCHLORIDE 850 MG/1
1 TABLET ORAL
Qty: 90 | Refills: 0
Start: 2022-11-10 | End: 2022-12-09

## 2023-02-17 ENCOUNTER — APPOINTMENT (OUTPATIENT)
Dept: ENDOCRINOLOGY | Facility: CLINIC | Age: 68
End: 2023-02-17